# Patient Record
Sex: MALE | Race: BLACK OR AFRICAN AMERICAN | Employment: FULL TIME | URBAN - METROPOLITAN AREA
[De-identification: names, ages, dates, MRNs, and addresses within clinical notes are randomized per-mention and may not be internally consistent; named-entity substitution may affect disease eponyms.]

---

## 2017-03-07 ENCOUNTER — HOSPITAL ENCOUNTER (EMERGENCY)
Age: 47
Discharge: HOME | End: 2017-03-07
Payer: SELF-PAY

## 2017-03-07 ENCOUNTER — APPOINTMENT (OUTPATIENT)
Dept: CT IMAGING | Age: 47
End: 2017-03-07
Payer: SELF-PAY

## 2017-03-07 ENCOUNTER — APPOINTMENT (OUTPATIENT)
Dept: GENERAL RADIOLOGY | Age: 47
End: 2017-03-07
Payer: SELF-PAY

## 2017-03-07 VITALS
BODY MASS INDEX: 32.93 KG/M2 | DIASTOLIC BLOOD PRESSURE: 78 MMHG | HEIGHT: 70 IN | RESPIRATION RATE: 14 BRPM | TEMPERATURE: 98 F | WEIGHT: 230 LBS | OXYGEN SATURATION: 95 % | SYSTOLIC BLOOD PRESSURE: 174 MMHG | HEART RATE: 89 BPM

## 2017-03-07 DIAGNOSIS — R07.89 ATYPICAL CHEST PAIN: Primary | ICD-10-CM

## 2017-03-07 DIAGNOSIS — R51.9 NONINTRACTABLE HEADACHE, UNSPECIFIED CHRONICITY PATTERN, UNSPECIFIED HEADACHE TYPE: ICD-10-CM

## 2017-03-07 LAB
ALBUMIN SERPL BCP-MCNC: 3.7 G/DL (ref 3.5–5)
ALBUMIN/GLOB SERPL: 1.1 {RATIO} (ref 1.2–3.5)
ALP SERPL-CCNC: 118 U/L (ref 50–136)
ALT SERPL-CCNC: 51 U/L (ref 12–65)
ANION GAP BLD CALC-SCNC: 7 MMOL/L (ref 7–16)
AST SERPL W P-5'-P-CCNC: 39 U/L (ref 15–37)
ATRIAL RATE: 79 BPM
BASOPHILS # BLD AUTO: 0 K/UL (ref 0–0.2)
BASOPHILS # BLD: 0 % (ref 0–2)
BILIRUB SERPL-MCNC: 0.4 MG/DL (ref 0.2–1.1)
BUN SERPL-MCNC: 16 MG/DL (ref 6–23)
CALCIUM SERPL-MCNC: 9.3 MG/DL (ref 8.3–10.4)
CALCULATED P AXIS, ECG09: 49 DEGREES
CALCULATED R AXIS, ECG10: 69 DEGREES
CALCULATED T AXIS, ECG11: -74 DEGREES
CHLORIDE SERPL-SCNC: 109 MMOL/L (ref 98–107)
CO2 SERPL-SCNC: 27 MMOL/L (ref 21–32)
CREAT SERPL-MCNC: 1.08 MG/DL (ref 0.8–1.5)
D DIMER PPP FEU-MCNC: 0.86 UG/ML(FEU)
DIAGNOSIS, 93000: NORMAL
DIASTOLIC BP, ECG02: NORMAL MMHG
DIFFERENTIAL METHOD BLD: ABNORMAL
EOSINOPHIL # BLD: 0.3 K/UL (ref 0–0.8)
EOSINOPHIL NFR BLD: 5 % (ref 0.5–7.8)
ERYTHROCYTE [DISTWIDTH] IN BLOOD BY AUTOMATED COUNT: 14.1 % (ref 11.9–14.6)
GLOBULIN SER CALC-MCNC: 3.5 G/DL (ref 2.3–3.5)
GLUCOSE SERPL-MCNC: 98 MG/DL (ref 65–100)
HCT VFR BLD AUTO: 43.7 % (ref 41.1–50.3)
HGB BLD-MCNC: 15.3 G/DL (ref 13.6–17.2)
IMM GRANULOCYTES # BLD: 0 K/UL (ref 0–0.5)
IMM GRANULOCYTES NFR BLD AUTO: 0.4 % (ref 0–5)
LYMPHOCYTES # BLD AUTO: 26 % (ref 13–44)
LYMPHOCYTES # BLD: 1.2 K/UL (ref 0.5–4.6)
MAGNESIUM SERPL-MCNC: 2.3 MG/DL (ref 1.8–2.4)
MCH RBC QN AUTO: 26.1 PG (ref 26.1–32.9)
MCHC RBC AUTO-ENTMCNC: 35 G/DL (ref 31.4–35)
MCV RBC AUTO: 74.6 FL (ref 79.6–97.8)
MONOCYTES # BLD: 0.4 K/UL (ref 0.1–1.3)
MONOCYTES NFR BLD AUTO: 8 % (ref 4–12)
NEUTS SEG # BLD: 2.9 K/UL (ref 1.7–8.2)
NEUTS SEG NFR BLD AUTO: 61 % (ref 43–78)
P-R INTERVAL, ECG05: 168 MS
PLATELET # BLD AUTO: 147 K/UL (ref 150–450)
PMV BLD AUTO: ABNORMAL FL (ref 10.8–14.1)
POTASSIUM SERPL-SCNC: 3.7 MMOL/L (ref 3.5–5.1)
PROT SERPL-MCNC: 7.2 G/DL (ref 6.3–8.2)
Q-T INTERVAL, ECG07: 370 MS
QRS DURATION, ECG06: 80 MS
QTC CALCULATION (BEZET), ECG08: 424 MS
RBC # BLD AUTO: 5.86 M/UL (ref 4.23–5.67)
SODIUM SERPL-SCNC: 143 MMOL/L (ref 136–145)
SYSTOLIC BP, ECG01: NORMAL MMHG
TROPONIN I BLD-MCNC: 0 NG/ML (ref 0–0.08)
TSH SERPL DL<=0.005 MIU/L-ACNC: 1.85 UIU/ML (ref 0.36–3.74)
VENTRICULAR RATE, ECG03: 79 BPM
WBC # BLD AUTO: 4.8 K/UL (ref 4.3–11.1)

## 2017-03-07 PROCEDURE — 93005 ELECTROCARDIOGRAM TRACING: CPT

## 2017-03-07 PROCEDURE — 84443 ASSAY THYROID STIM HORMONE: CPT

## 2017-03-07 PROCEDURE — 74011636320 HC RX REV CODE- 636/320

## 2017-03-07 PROCEDURE — 84484 ASSAY OF TROPONIN QUANT: CPT

## 2017-03-07 PROCEDURE — 85379 FIBRIN DEGRADATION QUANT: CPT

## 2017-03-07 PROCEDURE — 74011000258 HC RX REV CODE- 258

## 2017-03-07 PROCEDURE — 83735 ASSAY OF MAGNESIUM: CPT

## 2017-03-07 PROCEDURE — 96375 TX/PRO/DX INJ NEW DRUG ADDON: CPT

## 2017-03-07 PROCEDURE — 71010 XR CHEST PORT: CPT

## 2017-03-07 PROCEDURE — 99285 EMERGENCY DEPT VISIT HI MDM: CPT

## 2017-03-07 PROCEDURE — 74011250637 HC RX REV CODE- 250/637

## 2017-03-07 PROCEDURE — 70450 CT HEAD/BRAIN W/O DYE: CPT

## 2017-03-07 PROCEDURE — 71260 CT THORAX DX C+: CPT

## 2017-03-07 PROCEDURE — 96374 THER/PROPH/DIAG INJ IV PUSH: CPT

## 2017-03-07 PROCEDURE — 96376 TX/PRO/DX INJ SAME DRUG ADON: CPT

## 2017-03-07 PROCEDURE — 85025 COMPLETE CBC W/AUTO DIFF WBC: CPT

## 2017-03-07 PROCEDURE — 74011250636 HC RX REV CODE- 250/636

## 2017-03-07 PROCEDURE — 80053 COMPREHEN METABOLIC PANEL: CPT

## 2017-03-07 RX ORDER — HYDRALAZINE HYDROCHLORIDE 20 MG/ML
20 INJECTION INTRAMUSCULAR; INTRAVENOUS
Status: COMPLETED | OUTPATIENT
Start: 2017-03-07 | End: 2017-03-07

## 2017-03-07 RX ORDER — GUAIFENESIN 100 MG/5ML
81 LIQUID (ML) ORAL DAILY
Qty: 30 TAB | Refills: 0 | Status: SHIPPED | OUTPATIENT
Start: 2017-03-07 | End: 2021-05-04 | Stop reason: CLARIF

## 2017-03-07 RX ORDER — MORPHINE SULFATE 4 MG/ML
4 INJECTION, SOLUTION INTRAMUSCULAR; INTRAVENOUS
Status: COMPLETED | OUTPATIENT
Start: 2017-03-07 | End: 2017-03-07

## 2017-03-07 RX ORDER — HYDROCODONE BITARTRATE AND ACETAMINOPHEN 7.5; 325 MG/1; MG/1
1 TABLET ORAL
Qty: 12 TAB | Refills: 0 | Status: SHIPPED | OUTPATIENT
Start: 2017-03-07 | End: 2021-05-04 | Stop reason: CLARIF

## 2017-03-07 RX ORDER — ONDANSETRON 2 MG/ML
4 INJECTION INTRAMUSCULAR; INTRAVENOUS
Status: COMPLETED | OUTPATIENT
Start: 2017-03-07 | End: 2017-03-07

## 2017-03-07 RX ORDER — GUAIFENESIN 100 MG/5ML
162 LIQUID (ML) ORAL
Status: COMPLETED | OUTPATIENT
Start: 2017-03-07 | End: 2017-03-07

## 2017-03-07 RX ORDER — LOSARTAN POTASSIUM 25 MG/1
25 TABLET ORAL DAILY
COMMUNITY
End: 2021-05-04 | Stop reason: CLARIF

## 2017-03-07 RX ORDER — AMLODIPINE BESYLATE 10 MG/1
10 TABLET ORAL DAILY
COMMUNITY
End: 2021-05-04 | Stop reason: CLARIF

## 2017-03-07 RX ORDER — HYDROCHLOROTHIAZIDE 25 MG/1
25 TABLET ORAL DAILY
COMMUNITY
End: 2021-05-04 | Stop reason: CLARIF

## 2017-03-07 RX ORDER — ONDANSETRON 2 MG/ML
4 INJECTION INTRAMUSCULAR; INTRAVENOUS
Status: DISCONTINUED | OUTPATIENT
Start: 2017-03-07 | End: 2017-03-07

## 2017-03-07 RX ORDER — CLONIDINE HYDROCHLORIDE 0.1 MG/1
0.2 TABLET ORAL
Status: COMPLETED | OUTPATIENT
Start: 2017-03-07 | End: 2017-03-07

## 2017-03-07 RX ORDER — SODIUM CHLORIDE 0.9 % (FLUSH) 0.9 %
10 SYRINGE (ML) INJECTION
Status: COMPLETED | OUTPATIENT
Start: 2017-03-07 | End: 2017-03-07

## 2017-03-07 RX ADMIN — IOPAMIDOL 100 ML: 755 INJECTION, SOLUTION INTRAVENOUS at 07:40

## 2017-03-07 RX ADMIN — MORPHINE SULFATE 4 MG: 4 INJECTION, SOLUTION INTRAMUSCULAR; INTRAVENOUS at 03:53

## 2017-03-07 RX ADMIN — HYDRALAZINE HYDROCHLORIDE 20 MG: 20 INJECTION INTRAMUSCULAR; INTRAVENOUS at 06:15

## 2017-03-07 RX ADMIN — ASPIRIN 162 MG: 81 TABLET, CHEWABLE ORAL at 03:52

## 2017-03-07 RX ADMIN — ASPIRIN 162 MG: 81 TABLET, CHEWABLE ORAL at 04:04

## 2017-03-07 RX ADMIN — SODIUM CHLORIDE 100 ML: 900 INJECTION, SOLUTION INTRAVENOUS at 07:39

## 2017-03-07 RX ADMIN — ONDANSETRON 4 MG: 2 INJECTION, SOLUTION INTRAMUSCULAR; INTRAVENOUS at 08:00

## 2017-03-07 RX ADMIN — HYDRALAZINE HYDROCHLORIDE 20 MG: 20 INJECTION INTRAMUSCULAR; INTRAVENOUS at 03:32

## 2017-03-07 RX ADMIN — CLONIDINE HYDROCHLORIDE 0.2 MG: 0.1 TABLET ORAL at 05:24

## 2017-03-07 RX ADMIN — Medication 10 ML: at 07:39

## 2017-03-07 RX ADMIN — NITROGLYCERIN 1 INCH: 20 OINTMENT TOPICAL at 03:52

## 2017-03-07 NOTE — LETTER
[address] [facility] - [place], [place] EMERGENCY DEPT One [address] [id number] [phone] Work/School Note Date: 3/7/2017 To Whom It May concern: 
 
Ifeoma Orozco was seen and treated today in the emergency room by the following provider(s): 
No providers found. Ifeoma Orozco may return to work on 3/8/16.  
 
Sincerely, 
 
 
 
 
Gregg Vargas RN

## 2017-03-07 NOTE — ED TRIAGE NOTES
Pt c/o intermittent chest pain, headache and dizziness since 11am. Pt denies any N/V/D. Pt denies any vision changes. Pt denies any shortness of breath. Pt hypertensive on arrival. States he does take BP meds and has not missed any doses. Pt alert and oriented x 4. Respirations are even and unlabored.  PT appears in no acute distress upon arrival.

## 2017-03-07 NOTE — ED NOTES
I have reviewed medications, follow up provider options, and discharge instructions with the patient. The patient verbalized understanding. Copy of discharge information given to patient upon discharge. Prescription(s) given to patient. Patient discharged in no distress. \

## 2017-03-07 NOTE — DISCHARGE INSTRUCTIONS
Chest Pain: Care Instructions  Your Care Instructions  There are many things that can cause chest pain. Some are not serious and will get better on their own in a few days. But some kinds of chest pain need more testing and treatment. Your doctor may have recommended a follow-up visit in the next 8 to 12 hours. If you are not getting better, you may need more tests or treatment. Even though your doctor has released you, you still need to watch for any problems. The doctor carefully checked you, but sometimes problems can develop later. If you have new symptoms or if your symptoms do not get better, get medical care right away. If you have worse or different chest pain or pressure that lasts more than 5 minutes or you passed out (lost consciousness), call 911 or seek other emergency help right away. A medical visit is only one step in your treatment. Even if you feel better, you still need to do what your doctor recommends, such as going to all suggested follow-up appointments and taking medicines exactly as directed. This will help you recover and help prevent future problems. How can you care for yourself at home? · Rest until you feel better. · Take your medicine exactly as prescribed. Call your doctor if you think you are having a problem with your medicine. · Do not drive after taking a prescription pain medicine. When should you call for help? Call 911 if:  · You passed out (lost consciousness). · You have severe difficulty breathing. · You have symptoms of a heart attack. These may include:  ¨ Chest pain or pressure, or a strange feeling in your chest.  ¨ Sweating. ¨ Shortness of breath. ¨ Nausea or vomiting. ¨ Pain, pressure, or a strange feeling in your back, neck, jaw, or upper belly or in one or both shoulders or arms. ¨ Lightheadedness or sudden weakness. ¨ A fast or irregular heartbeat.   After you call 911, the  may tell you to chew 1 adult-strength or 2 to 4 low-dose aspirin. Wait for an ambulance. Do not try to drive yourself. Call your doctor today if:  · You have any trouble breathing. · Your chest pain gets worse. · You are dizzy or lightheaded, or you feel like you may faint. · You are not getting better as expected. · You are having new or different chest pain. Where can you learn more? Go to http://yan-joellen.info/. Enter A120 in the search box to learn more about \"Chest Pain: Care Instructions. \"  Current as of: May 27, 2016  Content Version: 11.1  © 3220-0969 [employer]. Care instructions adapted under license by [employer] (which disclaims liability or warranty for this information). If you have questions about a medical condition or this instruction, always ask your healthcare professional. [address] any warranty or liability for your use of this information. Headache: Care Instructions  Your Care Instructions    Headaches have many possible causes. Most headaches aren't a sign of a more serious problem, and they will get better on their own. Home treatment may help you feel better faster. The doctor has checked you carefully, but problems can develop later. If you notice any problems or new symptoms, get medical treatment right away. Follow-up care is a key part of your treatment and safety. Be sure to make and go to all appointments, and call your doctor if you are having problems. It's also a good idea to know your test results and keep a list of the medicines you take. How can you care for yourself at home? · Do not drive if you have taken a prescription pain medicine. · Rest in a quiet, dark room until your headache is gone. Close your eyes and try to relax or go to sleep. Don't watch TV or read. · Put a cold, moist cloth or cold pack on the painful area for 10 to 20 minutes at a time. Put a thin cloth between the cold pack and your skin.   · Use a warm, moist towel or a heating pad set on low to relax tight shoulder and neck muscles. · Have someone gently massage your neck and shoulders. · Take pain medicines exactly as directed. ¨ If the doctor gave you a prescription medicine for pain, take it as prescribed. ¨ If you are not taking a prescription pain medicine, ask your doctor if you can take an over-the-counter medicine. · Be careful not to take pain medicine more often than the instructions allow, because you may get worse or more frequent headaches when the medicine wears off. · Do not ignore new symptoms that occur with a headache, such as a fever, weakness or numbness, vision changes, or confusion. These may be signs of a more serious problem. To prevent headaches  · Keep a headache diary so you can figure out what triggers your headaches. Avoiding triggers may help you prevent headaches. Record when each headache began, how long it lasted, and what the pain was like (throbbing, aching, stabbing, or dull). Write down any other symptoms you had with the headache, such as nausea, flashing lights or dark spots, or sensitivity to bright light or loud noise. Note if the headache occurred near your period. List anything that might have triggered the headache, such as certain foods (chocolate, cheese, wine) or odors, smoke, bright light, stress, or lack of sleep. · Find healthy ways to deal with stress. Headaches are most common during or right after stressful times. Take time to relax before and after you do something that has caused a headache in the past.  · Try to keep your muscles relaxed by keeping good posture. Check your jaw, face, neck, and shoulder muscles for tension, and try relaxing them. When sitting at a desk, change positions often, and stretch for 30 seconds each hour. · Get plenty of sleep and exercise. · Eat regularly and well. Long periods without food can trigger a headache. · Treat yourself to a massage.  Some people find that regular massages are very helpful in relieving tension. · Limit caffeine by not drinking too much coffee, tea, or soda. But don't quit caffeine suddenly, because that can also give you headaches. · Reduce eyestrain from computers by blinking frequently and looking away from the computer screen every so often. Make sure you have proper eyewear and that your monitor is set up properly, about an arm's length away. · Seek help if you have depression or anxiety. Your headaches may be linked to these conditions. Treatment can both prevent headaches and help with symptoms of anxiety or depression. When should you call for help? Call 911 anytime you think you may need emergency care. For example, call if:  · You have signs of a stroke. These may include:  ¨ Sudden numbness, paralysis, or weakness in your face, arm, or leg, especially on only one side of your body. ¨ Sudden vision changes. ¨ Sudden trouble speaking. ¨ Sudden confusion or trouble understanding simple statements. ¨ Sudden problems with walking or balance. ¨ A sudden, severe headache that is different from past headaches. Call your doctor now or seek immediate medical care if:  · You have a new or worse headache. · Your headache gets much worse. Where can you learn more? Go to http://yan-joellen.info/. Enter M271 in the search box to learn more about \"Headache: Care Instructions. \"  Current as of: February 19, 2016  Content Version: 11.1  © 8047-4232 [employer]. Care instructions adapted under license by [employer] (which disclaims liability or warranty for this information). If you have questions about a medical condition or this instruction, always ask your healthcare professional. [place] [address] any warranty or liability for your use of this information.

## 2017-03-07 NOTE — ED PROVIDER NOTES
HPI Comments: 68-year-old male complaining of headache dizziness chest pain. Dizziness started around 1 AM this morning. Followed by chest pain and headache. Patient has a history of hypertension on several medications for that and has not missed any doses. Patient's blood pressure is 197/110 when he arrives. Patient is a 51 y.o. male presenting with chest pain and dizziness. The history is provided by the patient. Chest Pain (Angina)    This is a new problem. The current episode started 3 to 5 hours ago. The problem has not changed since onset. Episode frequency: comes and goes lasting a brief period time 2-3 minutes. The pain is associated with normal activity. The pain is at a severity of 6/10. The quality of the pain is described as pressure-like. The pain does not radiate. Associated symptoms include dizziness. He has tried nothing for the symptoms. Risk factors include hypertension. Dizziness   Associated symptoms include chest pain. Past Medical History:   Diagnosis Date    Hypertension        History reviewed. No pertinent surgical history. History reviewed. No pertinent family history. Social History     Social History    Marital status:      Spouse name: N/A    Number of children: N/A    Years of education: N/A     Occupational History    Not on file. Social History Main Topics    Smoking status: Never Smoker    Smokeless tobacco: Not on file    Alcohol use Not on file    Drug use: Not on file    Sexual activity: Not on file     Other Topics Concern    Not on file     Social History Narrative    No narrative on file         ALLERGIES: Review of patient's allergies indicates no known allergies. Review of Systems   Constitutional: Negative. Negative for activity change. HENT: Negative. Eyes: Negative. Respiratory: Negative. Cardiovascular: Positive for chest pain. Gastrointestinal: Negative. Genitourinary: Negative.     Musculoskeletal: Negative. Skin: Negative. Neurological: Positive for dizziness. Psychiatric/Behavioral: Negative. All other systems reviewed and are negative. There were no vitals filed for this visit. Physical Exam   Constitutional: He is oriented to person, place, and time. He appears well-developed and well-nourished. No distress. HENT:   Head: Normocephalic and atraumatic. Right Ear: External ear normal.   Left Ear: External ear normal.   Nose: Nose normal.   Mouth/Throat: Oropharynx is clear and moist. No oropharyngeal exudate. Eyes: Conjunctivae and EOM are normal. Pupils are equal, round, and reactive to light. Right eye exhibits no discharge. Left eye exhibits no discharge. No scleral icterus. Neck: Normal range of motion. Neck supple. No JVD present. No tracheal deviation present. Cardiovascular: Normal rate, regular rhythm and intact distal pulses. Pulmonary/Chest: Effort normal and breath sounds normal. No stridor. No respiratory distress. He has no wheezes. He exhibits no tenderness. Abdominal: Soft. Bowel sounds are normal. He exhibits no distension and no mass. There is no tenderness. Musculoskeletal: Normal range of motion. He exhibits no edema or tenderness. Neurological: He is alert and oriented to person, place, and time. No cranial nerve deficit. Skin: Skin is warm and dry. No rash noted. He is not diaphoretic. No erythema. No pallor. Psychiatric: He has a normal mood and affect. His behavior is normal. Thought content normal.   Nursing note and vitals reviewed. MDM  Number of Diagnoses or Management Options  Diagnosis management comments: Patient's headache resolved chest pain resolved and then pain-free murmurs    2 sets of enzymes all were 0 chest x-ray did show some nonspecific ST T wave abnormalities but nothing old to compare to the patient is pain-free with normal enzymes at this time.   Discussed with cardiology they will follow up closely in the office.        Amount and/or Complexity of Data Reviewed  Clinical lab tests: reviewed and ordered  Tests in the radiology section of CPT®: reviewed and ordered  Tests in the medicine section of CPT®: ordered and reviewed  Independent visualization of images, tracings, or specimens: yes    Risk of Complications, Morbidity, and/or Mortality  Presenting problems: high  Diagnostic procedures: high  Management options: high      ED Course       Procedures

## 2020-06-08 ENCOUNTER — HOSPITAL ENCOUNTER (EMERGENCY)
Age: 50
Discharge: HOME | End: 2020-06-09
Attending: EMERGENCY MEDICINE
Payer: COMMERCIAL

## 2020-06-08 ENCOUNTER — APPOINTMENT (OUTPATIENT)
Dept: GENERAL RADIOLOGY | Age: 50
End: 2020-06-08
Attending: EMERGENCY MEDICINE
Payer: COMMERCIAL

## 2020-06-08 DIAGNOSIS — R07.89 CHEST WALL PAIN: Primary | ICD-10-CM

## 2020-06-08 LAB
ALBUMIN SERPL-MCNC: 3.3 G/DL (ref 3.5–5)
ALBUMIN/GLOB SERPL: 0.9 {RATIO} (ref 1.2–3.5)
ALP SERPL-CCNC: 85 U/L (ref 50–136)
ALT SERPL-CCNC: 60 U/L (ref 12–65)
ANION GAP SERPL CALC-SCNC: 6 MMOL/L (ref 7–16)
AST SERPL-CCNC: 39 U/L (ref 15–37)
BASOPHILS # BLD: 0.1 K/UL (ref 0–0.2)
BASOPHILS NFR BLD: 1 % (ref 0–2)
BILIRUB SERPL-MCNC: 0.5 MG/DL (ref 0.2–1.1)
BUN SERPL-MCNC: 15 MG/DL (ref 6–23)
CALCIUM SERPL-MCNC: 8.8 MG/DL (ref 8.3–10.4)
CHLORIDE SERPL-SCNC: 106 MMOL/L (ref 98–107)
CO2 SERPL-SCNC: 28 MMOL/L (ref 21–32)
CREAT SERPL-MCNC: 1.63 MG/DL (ref 0.8–1.5)
CRP SERPL-MCNC: 0.5 MG/DL (ref 0–0.9)
DIFFERENTIAL METHOD BLD: ABNORMAL
EOSINOPHIL # BLD: 0.3 K/UL (ref 0–0.8)
EOSINOPHIL NFR BLD: 5 % (ref 0.5–7.8)
ERYTHROCYTE [DISTWIDTH] IN BLOOD BY AUTOMATED COUNT: 14.5 % (ref 11.9–14.6)
GLOBULIN SER CALC-MCNC: 3.8 G/DL (ref 2.3–3.5)
GLUCOSE SERPL-MCNC: 260 MG/DL (ref 65–100)
HCT VFR BLD AUTO: 39.9 % (ref 41.1–50.3)
HGB BLD-MCNC: 13 G/DL (ref 13.6–17.2)
IMM GRANULOCYTES # BLD AUTO: 0 K/UL (ref 0–0.5)
IMM GRANULOCYTES NFR BLD AUTO: 1 % (ref 0–5)
LYMPHOCYTES # BLD: 1.2 K/UL (ref 0.5–4.6)
LYMPHOCYTES NFR BLD: 22 % (ref 13–44)
MCH RBC QN AUTO: 25 PG (ref 26.1–32.9)
MCHC RBC AUTO-ENTMCNC: 32.6 G/DL (ref 31.4–35)
MCV RBC AUTO: 76.9 FL (ref 79.6–97.8)
MONOCYTES # BLD: 0.3 K/UL (ref 0.1–1.3)
MONOCYTES NFR BLD: 6 % (ref 4–12)
NEUTS SEG # BLD: 3.6 K/UL (ref 1.7–8.2)
NEUTS SEG NFR BLD: 66 % (ref 43–78)
NRBC # BLD: 0 K/UL (ref 0–0.2)
PLATELET # BLD AUTO: 169 K/UL (ref 150–450)
PMV BLD AUTO: 11.6 FL (ref 9.4–12.3)
POTASSIUM SERPL-SCNC: 3.8 MMOL/L (ref 3.5–5.1)
PROT SERPL-MCNC: 7.1 G/DL (ref 6.3–8.2)
RBC # BLD AUTO: 5.19 M/UL (ref 4.23–5.6)
SODIUM SERPL-SCNC: 140 MMOL/L (ref 136–145)
TROPONIN-HIGH SENSITIVITY: 6.9 PG/ML (ref 0–14)
WBC # BLD AUTO: 5.5 K/UL (ref 4.3–11.1)

## 2020-06-08 PROCEDURE — 86140 C-REACTIVE PROTEIN: CPT

## 2020-06-08 PROCEDURE — 85379 FIBRIN DEGRADATION QUANT: CPT

## 2020-06-08 PROCEDURE — 85025 COMPLETE CBC W/AUTO DIFF WBC: CPT

## 2020-06-08 PROCEDURE — 96374 THER/PROPH/DIAG INJ IV PUSH: CPT

## 2020-06-08 PROCEDURE — 80053 COMPREHEN METABOLIC PANEL: CPT

## 2020-06-08 PROCEDURE — 99285 EMERGENCY DEPT VISIT HI MDM: CPT

## 2020-06-08 PROCEDURE — 71046 X-RAY EXAM CHEST 2 VIEWS: CPT

## 2020-06-08 PROCEDURE — 93005 ELECTROCARDIOGRAM TRACING: CPT | Performed by: EMERGENCY MEDICINE

## 2020-06-08 PROCEDURE — 74011250636 HC RX REV CODE- 250/636: Performed by: EMERGENCY MEDICINE

## 2020-06-08 PROCEDURE — 84484 ASSAY OF TROPONIN QUANT: CPT

## 2020-06-08 RX ORDER — KETOROLAC TROMETHAMINE 30 MG/ML
30 INJECTION, SOLUTION INTRAMUSCULAR; INTRAVENOUS
Status: COMPLETED | OUTPATIENT
Start: 2020-06-08 | End: 2020-06-08

## 2020-06-08 RX ADMIN — KETOROLAC TROMETHAMINE 30 MG: 30 INJECTION, SOLUTION INTRAMUSCULAR; INTRAVENOUS at 23:12

## 2020-06-08 NOTE — ED TRIAGE NOTES
Patient ambulatory to triage without difficulty with mask in place. Patient reports chest pain all across chest and high blood pressure for 2 days. States he is on losartan, hctz, and carvedilol for this blood pressure and has been compliant with his medications. Reports nausea and some SOB. Denies vomiting or diarrhea.

## 2020-06-09 VITALS
SYSTOLIC BLOOD PRESSURE: 171 MMHG | HEIGHT: 68 IN | OXYGEN SATURATION: 94 % | TEMPERATURE: 98.2 F | HEART RATE: 88 BPM | DIASTOLIC BLOOD PRESSURE: 103 MMHG | RESPIRATION RATE: 18 BRPM | WEIGHT: 290 LBS | BODY MASS INDEX: 43.95 KG/M2

## 2020-06-09 LAB
ATRIAL RATE: 91 BPM
CALCULATED P AXIS, ECG09: 55 DEGREES
CALCULATED R AXIS, ECG10: 92 DEGREES
CALCULATED T AXIS, ECG11: -43 DEGREES
D DIMER PPP FEU-MCNC: 0.69 UG/ML(FEU)
DIAGNOSIS, 93000: NORMAL
P-R INTERVAL, ECG05: 164 MS
Q-T INTERVAL, ECG07: 360 MS
QRS DURATION, ECG06: 80 MS
QTC CALCULATION (BEZET), ECG08: 442 MS
VENTRICULAR RATE, ECG03: 91 BPM

## 2020-06-09 RX ORDER — DICLOFENAC SODIUM 75 MG/1
75 TABLET, DELAYED RELEASE ORAL 2 TIMES DAILY
Qty: 20 TAB | Refills: 0 | Status: SHIPPED | OUTPATIENT
Start: 2020-06-09 | End: 2021-05-04 | Stop reason: CLARIF

## 2020-06-09 NOTE — ED PROVIDER NOTES
Patient presents emerge department with complaints of chest pain and shortness of breath. He states the symptoms began yesterday and have been present constantly ever since onset. He describes a pressure-like sensation across the anterior chest without radiation. It is constantly dull and intermittently sharp. No increasing or decreasing factors are noted. He denies any fever or chills or cough. The history is provided by the patient. Chest Pain    This is a new problem. The current episode started yesterday. The problem has not changed since onset. The problem occurs constantly. Associated with: Unknown. Pain location: Anterior chest. The pain is at a severity of 7/10. The pain is moderate. The quality of the pain is described as pressure-like and sharp. The pain does not radiate. Exacerbated by: Nothing. Associated symptoms include shortness of breath. Pertinent negatives include no abdominal pain, no back pain, no claudication, no cough, no diaphoresis, no dizziness, no exertional chest pressure, no fever, no headaches, no hemoptysis, no irregular heartbeat, no leg pain, no lower extremity edema, no malaise/fatigue, no nausea, no near-syncope, no numbness, no orthopnea, no palpitations, no PND, no sputum production, no vomiting and no weakness. He has tried nothing for the symptoms. The treatment provided no relief. Risk factors include male gender, hypertension and obesity. Past Medical History:   Diagnosis Date    Hypertension        No past surgical history on file. No family history on file.     Social History     Socioeconomic History    Marital status:      Spouse name: Not on file    Number of children: Not on file    Years of education: Not on file    Highest education level: Not on file   Occupational History    Not on file   Social Needs    Financial resource strain: Not on file    Food insecurity     Worry: Not on file     Inability: Not on file   [employer] needs     Medical: Not on file     Non-medical: Not on file   Tobacco Use    Smoking status: Never Smoker   Substance and Sexual Activity    Alcohol use: Not on file    Drug use: Not on file    Sexual activity: Not on file   Lifestyle    Physical activity     Days per week: Not on file     Minutes per session: Not on file    Stress: Not on file   Relationships    Social connections     Talks on phone: Not on file     Gets together: Not on file     Attends Congregation service: Not on file     Active member of club or organization: Not on file     Attends meetings of clubs or organizations: Not on file     Relationship status: Not on file    Intimate partner violence     Fear of current or ex partner: Not on file     Emotionally abused: Not on file     Physically abused: Not on file     Forced sexual activity: Not on file   Other Topics Concern    Not on file   Social History Narrative    Not on file         ALLERGIES: Patient has no known allergies. Review of Systems   Constitutional: Negative for diaphoresis, fever and malaise/fatigue. Respiratory: Positive for shortness of breath. Negative for cough, hemoptysis and sputum production. Cardiovascular: Positive for chest pain. Negative for palpitations, orthopnea, claudication, PND and near-syncope. Gastrointestinal: Negative for abdominal pain, nausea and vomiting. Musculoskeletal: Negative for back pain. Neurological: Negative for dizziness, weakness, numbness and headaches. All other systems reviewed and are negative. Vitals:    06/08/20 2113 06/08/20 2144 06/08/20 2213 06/08/20 2244   BP: 153/88 (!) 164/97 190/88 (!) 166/97   Pulse: 83 81 79 79   Resp:       Temp:       SpO2:       Weight:       Height:                Physical Exam  Vitals signs and nursing note reviewed. Constitutional:       General: He is not in acute distress. Appearance: Normal appearance. He is normal weight.  He is not ill-appearing, toxic-appearing or diaphoretic. HENT:      Head: Normocephalic and atraumatic. Nose: Nose normal.      Mouth/Throat:      Mouth: Mucous membranes are moist.      Pharynx: Oropharynx is clear. Eyes:      Extraocular Movements: Extraocular movements intact. Conjunctiva/sclera: Conjunctivae normal.      Pupils: Pupils are equal, round, and reactive to light. Neck:      Musculoskeletal: Normal range of motion and neck supple. Cardiovascular:      Rate and Rhythm: Normal rate and regular rhythm. Pulses: Normal pulses. Heart sounds: Normal heart sounds. Pulmonary:      Effort: Pulmonary effort is normal.      Breath sounds: Normal breath sounds. Chest:      Chest wall: No tenderness. Musculoskeletal: Normal range of motion. Skin:     General: Skin is warm and dry. Capillary Refill: Capillary refill takes less than 2 seconds. Neurological:      General: No focal deficit present. Mental Status: He is alert and oriented to person, place, and time. Mental status is at baseline. Psychiatric:         Mood and Affect: Mood normal.         Behavior: Behavior normal.         Thought Content: Thought content normal.          MDM  Number of Diagnoses or Management Options  Diagnosis management comments: Patient symptoms have been present constantly since yesterday and EKG shows nothing acute and troponin is negative cardiac etiology is ruled out. Will assess for alternative diagnosis such as pulmonary embolism, although patient had venous ultrasound of the extremities in March which was negative for DVT. Musculoskeletal etiology is most likely. Amount and/or Complexity of Data Reviewed  Clinical lab tests: ordered and reviewed  Tests in the radiology section of CPT®: ordered and reviewed  Review and summarize past medical records: yes  Discuss the patient with other providers: yes  Independent visualization of images, tracings, or specimens: yes (EKG at 1818:  Is a normal, right axis deviation is noted no acute ischemic changes.)    Risk of Complications, Morbidity, and/or Mortality  Presenting problems: moderate  Diagnostic procedures: moderate  Management options: moderate    Patient Progress  Patient progress: stable         Procedures

## 2020-06-09 NOTE — ED NOTES
I have reviewed discharge instructions with the patient. The patient verbalized understanding. Patient left ED via Discharge Method: ambulatory to Home with family. Opportunity for questions and clarification provided. Patient given 1 scripts. To continue your aftercare when you leave the hospital, you may receive an automated call from our care team to check in on how you are doing. This is a free service and part of our promise to provide the best care and service to meet your aftercare needs.  If you have questions, or wish to unsubscribe from this service please call [phone]. Thank you for Choosing our [facility].

## 2021-03-24 ENCOUNTER — HOSPITAL ENCOUNTER (OUTPATIENT)
Dept: LAB | Age: 51
Discharge: HOME | End: 2021-03-24

## 2021-03-24 PROCEDURE — 89060 EXAM SYNOVIAL FLUID CRYSTALS: CPT

## 2021-03-24 PROCEDURE — 89050 BODY FLUID CELL COUNT: CPT

## 2021-03-24 PROCEDURE — 87070 CULTURE OTHR SPECIMN AEROBIC: CPT

## 2021-03-25 LAB
APPEARANCE FLD: CLEAR
BODY FLD TYPE: NORMAL
COLOR FLD: YELLOW
CRYSTALS FLD MICRO: NORMAL
LYMPHOCYTES NFR BRONCH MANUAL: 94 %
MACROPHAGES NFR BRONCH MANUAL: 2 %
NEUTROPHILS NFR BRONCH MANUAL: 4 %
NUC CELL # FLD: 383 /CU MM
RBC # FLD: <1000 /CU MM
SPECIMEN SOURCE FLD: NORMAL

## 2021-03-27 LAB
BACTERIA SPEC CULT: NORMAL
GRAM STN SPEC: NORMAL
GRAM STN SPEC: NORMAL
SERVICE CMNT-IMP: NORMAL

## 2021-05-04 ENCOUNTER — HOSPITAL ENCOUNTER (OUTPATIENT)
Dept: SURGERY | Age: 51
Discharge: HOME | End: 2021-05-04

## 2021-05-04 VITALS
OXYGEN SATURATION: 98 % | HEIGHT: 69 IN | DIASTOLIC BLOOD PRESSURE: 101 MMHG | TEMPERATURE: 98.2 F | RESPIRATION RATE: 18 BRPM | HEART RATE: 77 BPM | WEIGHT: 285 LBS | SYSTOLIC BLOOD PRESSURE: 143 MMHG | BODY MASS INDEX: 42.21 KG/M2

## 2021-05-04 LAB
ANION GAP SERPL CALC-SCNC: 4 MMOL/L (ref 7–16)
BACTERIA SPEC CULT: NORMAL
BASOPHILS # BLD: 0.1 K/UL (ref 0–0.2)
BASOPHILS NFR BLD: 1 % (ref 0–2)
BUN SERPL-MCNC: 14 MG/DL (ref 6–23)
CALCIUM SERPL-MCNC: 9.8 MG/DL (ref 8.3–10.4)
CHLORIDE SERPL-SCNC: 106 MMOL/L (ref 98–107)
CO2 SERPL-SCNC: 30 MMOL/L (ref 21–32)
CREAT SERPL-MCNC: 1.18 MG/DL (ref 0.8–1.5)
DIFFERENTIAL METHOD BLD: ABNORMAL
EOSINOPHIL # BLD: 0.2 K/UL (ref 0–0.8)
EOSINOPHIL NFR BLD: 3 % (ref 0.5–7.8)
ERYTHROCYTE [DISTWIDTH] IN BLOOD BY AUTOMATED COUNT: 15 % (ref 11.9–14.6)
GLUCOSE BLD STRIP.AUTO-MCNC: 77 MG/DL (ref 65–100)
GLUCOSE SERPL-MCNC: 75 MG/DL (ref 65–100)
HCT VFR BLD AUTO: 44.1 % (ref 41.1–50.3)
HGB BLD-MCNC: 14.4 G/DL (ref 13.6–17.2)
IMM GRANULOCYTES # BLD AUTO: 0 K/UL (ref 0–0.5)
IMM GRANULOCYTES NFR BLD AUTO: 1 % (ref 0–5)
LYMPHOCYTES # BLD: 1.7 K/UL (ref 0.5–4.6)
LYMPHOCYTES NFR BLD: 24 % (ref 13–44)
MCH RBC QN AUTO: 25 PG (ref 26.1–32.9)
MCHC RBC AUTO-ENTMCNC: 32.7 G/DL (ref 31.4–35)
MCV RBC AUTO: 76.6 FL (ref 79.6–97.8)
MONOCYTES # BLD: 0.7 K/UL (ref 0.1–1.3)
MONOCYTES NFR BLD: 11 % (ref 4–12)
NEUTS SEG # BLD: 4.1 K/UL (ref 1.7–8.2)
NEUTS SEG NFR BLD: 60 % (ref 43–78)
NRBC # BLD: 0 K/UL (ref 0–0.2)
PLATELET # BLD AUTO: 206 K/UL (ref 150–450)
PMV BLD AUTO: 11.1 FL (ref 9.4–12.3)
POTASSIUM SERPL-SCNC: 3.8 MMOL/L (ref 3.5–5.1)
RBC # BLD AUTO: 5.76 M/UL (ref 4.23–5.6)
SERVICE CMNT-IMP: NORMAL
SERVICE CMNT-IMP: NORMAL
SODIUM SERPL-SCNC: 140 MMOL/L (ref 136–145)
WBC # BLD AUTO: 6.9 K/UL (ref 4.3–11.1)

## 2021-05-04 PROCEDURE — 85025 COMPLETE CBC W/AUTO DIFF WBC: CPT

## 2021-05-04 PROCEDURE — 82962 GLUCOSE BLOOD TEST: CPT

## 2021-05-04 PROCEDURE — 87641 MR-STAPH DNA AMP PROBE: CPT

## 2021-05-04 PROCEDURE — 36415 COLL VENOUS BLD VENIPUNCTURE: CPT

## 2021-05-04 PROCEDURE — 80048 BASIC METABOLIC PNL TOTAL CA: CPT

## 2021-05-04 RX ORDER — SEMAGLUTIDE 1.34 MG/ML
1 INJECTION, SOLUTION SUBCUTANEOUS
COMMUNITY

## 2021-05-04 RX ORDER — INSULIN GLARGINE 100 [IU]/ML
15 INJECTION, SOLUTION SUBCUTANEOUS
COMMUNITY
End: 2022-05-02

## 2021-05-04 RX ORDER — CARVEDILOL 12.5 MG/1
12.5 TABLET ORAL 2 TIMES DAILY WITH MEALS
COMMUNITY

## 2021-05-04 NOTE — PERIOP NOTES
PLEASE CONTINUE TAKING ALL PRESCRIPTION MEDICATIONS UP TO THE DAY OF SURGERY UNLESS OTHERWISE DIRECTED BELOW. DISCONTINUE all vitamins and supplements 7 days prior to surgery. DISCONTINUE Non-Steriodal Anti-Inflammatory (NSAIDS) such as Advil and Aleve 5 days prior to surgery. Home Medications to take  the day of surgery    Carvedilol           Home Medications   to Hold   No Farxiga morning of surgery,    Lantus 12 units night before surgery     Comments    Covid test 5/4/21 @ 2 2 [facility],[address] [facility], 0 [facility] of 1 visitor per patient discussed. Please do not bring home medications with you on the day of surgery unless otherwise directed by your nurse. If you are instructed to bring home medications, please give them to your nurse as they will be administered by the nursing staff. If you have any questions, please call [facility] [phone] or [facility] [phone]. A copy of this note was provided to the patient for reference.

## 2021-05-04 NOTE — PERIOP NOTES
Patient verified name and     Order for consent  found in EHR and matches case posting; patient verified. Type 3 surgery, walk in assessment complete. Labs per surgeon: none;   Labs per anesthesia protocol: cbc,bmp,MRSA, SQBS =77; results pending  [id number]     Patient COVID test date 21; Patient did  show for the appointment. The testing center is located at the [address], [place]. If appointment is needed patient provided telephone number of [phone]. Hospital approved surgical skin cleanser and instructions given per hospital policy. Patient provided with and instructed on educational handouts including Guide to Surgery, Pain Management, Hand Hygiene, Blood Transfusion Education, and [facility] Brochure. Patient answered medical/surgical history questions at their best of ability. All prior to admission medications documented in [facility]. Original medication prescription bottle not visualized during patient appointment. Patient instructed to hold all vitamins 7 days prior to surgery and NSAIDS 5 days prior to surgery, patient verbalized understanding. Patient teach back successful and patient demonstrates knowledge of instructions.

## 2021-05-05 NOTE — PROGRESS NOTES
MRSA/SA nasal swab results reveals both are negative. BMP results noted and are within anesthesia protocols.

## 2021-05-10 ENCOUNTER — ANESTHESIA EVENT (OUTPATIENT)
Dept: SURGERY | Age: 51
End: 2021-05-10

## 2021-05-11 ENCOUNTER — HOSPITAL ENCOUNTER (OUTPATIENT)
Age: 51
Setting detail: OBSERVATION
Discharge: HOME HEALTH | End: 2021-05-12
Attending: ORTHOPAEDIC SURGERY | Admitting: ORTHOPAEDIC SURGERY
Payer: COMMERCIAL

## 2021-05-11 ENCOUNTER — ANESTHESIA (OUTPATIENT)
Dept: SURGERY | Age: 51
End: 2021-05-11

## 2021-05-11 DIAGNOSIS — L76.82 PAIN AT SURGICAL INCISION: Primary | ICD-10-CM

## 2021-05-11 PROBLEM — M17.11 ARTHRITIS OF RIGHT KNEE: Status: ACTIVE | Noted: 2021-05-11

## 2021-05-11 LAB
ABO + RH BLD: NORMAL
ANION GAP SERPL CALC-SCNC: 3 MMOL/L (ref 7–16)
APPEARANCE UR: CLEAR
APTT PPP: 28.7 SEC (ref 24.1–35.1)
ATRIAL RATE: 80 BPM
BACTERIA SPEC CULT: NORMAL
BACTERIA URNS QL MICRO: 0 /HPF
BASOPHILS # BLD: 0 K/UL (ref 0–0.2)
BASOPHILS NFR BLD: 0 % (ref 0–2)
BILIRUB UR QL: NEGATIVE
BLOOD GROUP ANTIBODIES SERPL: NORMAL
BUN SERPL-MCNC: 18 MG/DL (ref 6–23)
CALCIUM SERPL-MCNC: 8.7 MG/DL (ref 8.3–10.4)
CALCULATED P AXIS, ECG09: 34 DEGREES
CALCULATED R AXIS, ECG10: 16 DEGREES
CALCULATED T AXIS, ECG11: 65 DEGREES
CASTS URNS QL MICRO: 0 /LPF
CHLORIDE SERPL-SCNC: 110 MMOL/L (ref 98–107)
CO2 SERPL-SCNC: 30 MMOL/L (ref 21–32)
COLOR UR: YELLOW
CREAT SERPL-MCNC: 1.46 MG/DL (ref 0.8–1.5)
DIAGNOSIS, 93000: NORMAL
DIFFERENTIAL METHOD BLD: ABNORMAL
EOSINOPHIL # BLD: 0.1 K/UL (ref 0–0.8)
EOSINOPHIL NFR BLD: 2 % (ref 0.5–7.8)
EPI CELLS #/AREA URNS HPF: 0 /HPF
ERYTHROCYTE [DISTWIDTH] IN BLOOD BY AUTOMATED COUNT: 14.6 % (ref 11.9–14.6)
EST. AVERAGE GLUCOSE BLD GHB EST-MCNC: 126 MG/DL
GLUCOSE BLD STRIP.AUTO-MCNC: 112 MG/DL (ref 65–100)
GLUCOSE BLD STRIP.AUTO-MCNC: 117 MG/DL (ref 65–100)
GLUCOSE BLD STRIP.AUTO-MCNC: 135 MG/DL (ref 65–100)
GLUCOSE SERPL-MCNC: 156 MG/DL (ref 65–100)
GLUCOSE UR STRIP.AUTO-MCNC: NEGATIVE MG/DL
HBA1C MFR BLD: 6 % (ref 4.2–6.3)
HCT VFR BLD AUTO: 43.8 % (ref 41.1–50.3)
HGB BLD-MCNC: 13 G/DL (ref 13.6–17.2)
HGB BLD-MCNC: 13.6 G/DL (ref 13.6–17.2)
HGB UR QL STRIP: NEGATIVE
IMM GRANULOCYTES # BLD AUTO: 0.1 K/UL (ref 0–0.5)
IMM GRANULOCYTES NFR BLD AUTO: 1 % (ref 0–5)
INR PPP: 1
KETONES UR QL STRIP.AUTO: NEGATIVE MG/DL
LEUKOCYTE ESTERASE UR QL STRIP.AUTO: NEGATIVE
LYMPHOCYTES # BLD: 1 K/UL (ref 0.5–4.6)
LYMPHOCYTES NFR BLD: 14 % (ref 13–44)
MCH RBC QN AUTO: 24.6 PG (ref 26.1–32.9)
MCHC RBC AUTO-ENTMCNC: 31.1 G/DL (ref 31.4–35)
MCV RBC AUTO: 79.2 FL (ref 79.6–97.8)
MONOCYTES # BLD: 0.3 K/UL (ref 0.1–1.3)
MONOCYTES NFR BLD: 4 % (ref 4–12)
NEUTS SEG # BLD: 5.8 K/UL (ref 1.7–8.2)
NEUTS SEG NFR BLD: 80 % (ref 43–78)
NITRITE UR QL STRIP.AUTO: NEGATIVE
NRBC # BLD: 0 K/UL (ref 0–0.2)
P-R INTERVAL, ECG05: 166 MS
PH UR STRIP: 5.5 [PH] (ref 5–9)
PLATELET # BLD AUTO: 171 K/UL (ref 150–450)
PMV BLD AUTO: 11 FL (ref 9.4–12.3)
POTASSIUM SERPL-SCNC: 3.8 MMOL/L (ref 3.5–5.1)
PROT UR STRIP-MCNC: ABNORMAL MG/DL
PROTHROMBIN TIME: 13.6 SEC (ref 12.5–14.7)
Q-T INTERVAL, ECG07: 380 MS
QRS DURATION, ECG06: 78 MS
QTC CALCULATION (BEZET), ECG08: 438 MS
RBC # BLD AUTO: 5.53 M/UL (ref 4.23–5.6)
RBC #/AREA URNS HPF: 0 /HPF
SERVICE CMNT-IMP: ABNORMAL
SERVICE CMNT-IMP: NORMAL
SODIUM SERPL-SCNC: 143 MMOL/L (ref 136–145)
SP GR UR REFRACTOMETRY: 1.02 (ref 1–1.02)
SPECIMEN EXP DATE BLD: NORMAL
UROBILINOGEN UR QL STRIP.AUTO: 0.2 EU/DL (ref 0.2–1)
VENTRICULAR RATE, ECG03: 80 BPM
WBC # BLD AUTO: 7.3 K/UL (ref 4.3–11.1)
WBC URNS QL MICRO: ABNORMAL /HPF

## 2021-05-11 PROCEDURE — 77030006790 HC BLD SAW OSC ZIMM -B: Performed by: ORTHOPAEDIC SURGERY

## 2021-05-11 PROCEDURE — 74011250636 HC RX REV CODE- 250/636: Performed by: ANESTHESIOLOGY

## 2021-05-11 PROCEDURE — 74011000250 HC RX REV CODE- 250: Performed by: ORTHOPAEDIC SURGERY

## 2021-05-11 PROCEDURE — 2709999900 HC NON-CHARGEABLE SUPPLY

## 2021-05-11 PROCEDURE — 74011250636 HC RX REV CODE- 250/636: Performed by: ORTHOPAEDIC SURGERY

## 2021-05-11 PROCEDURE — 74011000250 HC RX REV CODE- 250: Performed by: ANESTHESIOLOGY

## 2021-05-11 PROCEDURE — 76010000171 HC OR TIME 2 TO 2.5 HR INTENSV-TIER 1: Performed by: ORTHOPAEDIC SURGERY

## 2021-05-11 PROCEDURE — 27447 TOTAL KNEE ARTHROPLASTY: CPT | Performed by: ORTHOPAEDIC SURGERY

## 2021-05-11 PROCEDURE — 74011636637 HC RX REV CODE- 636/637: Performed by: ORTHOPAEDIC SURGERY

## 2021-05-11 PROCEDURE — 77030002933 HC SUT MCRYL J&J -A: Performed by: ORTHOPAEDIC SURGERY

## 2021-05-11 PROCEDURE — 76060000035 HC ANESTHESIA 2 TO 2.5 HR: Performed by: ORTHOPAEDIC SURGERY

## 2021-05-11 PROCEDURE — 87641 MR-STAPH DNA AMP PROBE: CPT

## 2021-05-11 PROCEDURE — 77030020274 HC MISC IMPL ORTHOPEDIC: Performed by: ORTHOPAEDIC SURGERY

## 2021-05-11 PROCEDURE — 76210000063 HC OR PH I REC FIRST 0.5 HR: Performed by: ORTHOPAEDIC SURGERY

## 2021-05-11 PROCEDURE — 77030003665 HC NDL SPN BBMI -A: Performed by: ANESTHESIOLOGY

## 2021-05-11 PROCEDURE — 85610 PROTHROMBIN TIME: CPT

## 2021-05-11 PROCEDURE — 86900 BLOOD TYPING SEROLOGIC ABO: CPT

## 2021-05-11 PROCEDURE — 99218 HC RM OBSERVATION: CPT

## 2021-05-11 PROCEDURE — 74011250637 HC RX REV CODE- 250/637: Performed by: ORTHOPAEDIC SURGERY

## 2021-05-11 PROCEDURE — 93005 ELECTROCARDIOGRAM TRACING: CPT | Performed by: ORTHOPAEDIC SURGERY

## 2021-05-11 PROCEDURE — 85730 THROMBOPLASTIN TIME PARTIAL: CPT

## 2021-05-11 PROCEDURE — 85018 HEMOGLOBIN: CPT

## 2021-05-11 PROCEDURE — 77030008462 HC STPLR SKN PROX J&J -A: Performed by: ORTHOPAEDIC SURGERY

## 2021-05-11 PROCEDURE — 77030011208: Performed by: ORTHOPAEDIC SURGERY

## 2021-05-11 PROCEDURE — 74011000250 HC RX REV CODE- 250: Performed by: NURSE ANESTHETIST, CERTIFIED REGISTERED

## 2021-05-11 PROCEDURE — 77030019557 HC ELECTRD VES SEAL MEDT -F: Performed by: ORTHOPAEDIC SURGERY

## 2021-05-11 PROCEDURE — 77030007880 HC KT SPN EPDRL BBMI -B: Performed by: ANESTHESIOLOGY

## 2021-05-11 PROCEDURE — 81003 URINALYSIS AUTO W/O SCOPE: CPT

## 2021-05-11 PROCEDURE — 77030018547 HC SUT ETHBND1 J&J -B: Performed by: ORTHOPAEDIC SURGERY

## 2021-05-11 PROCEDURE — 74011250636 HC RX REV CODE- 250/636: Performed by: NURSE ANESTHETIST, CERTIFIED REGISTERED

## 2021-05-11 PROCEDURE — 85025 COMPLETE CBC W/AUTO DIFF WBC: CPT

## 2021-05-11 PROCEDURE — 76010010054 HC POST OP PAIN BLOCK: Performed by: ORTHOPAEDIC SURGERY

## 2021-05-11 PROCEDURE — 82962 GLUCOSE BLOOD TEST: CPT

## 2021-05-11 PROCEDURE — 83036 HEMOGLOBIN GLYCOSYLATED A1C: CPT

## 2021-05-11 PROCEDURE — 76942 ECHO GUIDE FOR BIOPSY: CPT | Performed by: ORTHOPAEDIC SURGERY

## 2021-05-11 PROCEDURE — 77030012935 HC DRSG AQUACEL BMS -B: Performed by: ORTHOPAEDIC SURGERY

## 2021-05-11 PROCEDURE — 77030003602 HC NDL NRV BLK BBMI -B: Performed by: ANESTHESIOLOGY

## 2021-05-11 PROCEDURE — 80048 BASIC METABOLIC PNL TOTAL CA: CPT

## 2021-05-11 PROCEDURE — C1776 JOINT DEVICE (IMPLANTABLE): HCPCS | Performed by: ORTHOPAEDIC SURGERY

## 2021-05-11 PROCEDURE — 36415 COLL VENOUS BLD VENIPUNCTURE: CPT

## 2021-05-11 PROCEDURE — 2709999900 HC NON-CHARGEABLE SUPPLY: Performed by: ORTHOPAEDIC SURGERY

## 2021-05-11 PROCEDURE — 74011250637 HC RX REV CODE- 250/637: Performed by: ANESTHESIOLOGY

## 2021-05-11 DEVICE — JOURNEY II BCS XLPE ARTICULAR                                    INSERT SIZE 5-6 RIGHT 10MM
Type: IMPLANTABLE DEVICE | Site: KNEE | Status: FUNCTIONAL
Brand: JOURNEY

## 2021-05-11 DEVICE — JOURNEY BCS PATELLA  RESURFACING                                    ROUND 35 MM  STD
Type: IMPLANTABLE DEVICE | Site: KNEE | Status: FUNCTIONAL
Brand: JOURNEY

## 2021-05-11 DEVICE — KNEE K1 TOT HEMI STD CEM IMPL CAPPED K1 SN: Type: IMPLANTABLE DEVICE | Status: FUNCTIONAL

## 2021-05-11 DEVICE — JOURNEY II BCS FEMORAL OXINIUM                                    RIGHT SIZE 6
Type: IMPLANTABLE DEVICE | Site: KNEE | Status: FUNCTIONAL
Brand: JOURNEY

## 2021-05-11 DEVICE — RALLY MV AB BONE CEMENT 40 GRAMS
Type: IMPLANTABLE DEVICE | Site: KNEE | Status: FUNCTIONAL
Brand: RALLY

## 2021-05-11 DEVICE — JOURNEY TIBIAL BASEPLATE NONPOROUS                                    RIGHT SIZE 5
Type: IMPLANTABLE DEVICE | Site: KNEE | Status: FUNCTIONAL
Brand: JOURNEY

## 2021-05-11 RX ORDER — CELECOXIB 200 MG/1
200 CAPSULE ORAL ONCE
Status: COMPLETED | OUTPATIENT
Start: 2021-05-11 | End: 2021-05-11

## 2021-05-11 RX ORDER — KETOROLAC TROMETHAMINE 30 MG/ML
INJECTION, SOLUTION INTRAMUSCULAR; INTRAVENOUS AS NEEDED
Status: DISCONTINUED | OUTPATIENT
Start: 2021-05-11 | End: 2021-05-11 | Stop reason: HOSPADM

## 2021-05-11 RX ORDER — ACETAMINOPHEN 500 MG
1000 TABLET ORAL EVERY 6 HOURS
Status: DISCONTINUED | OUTPATIENT
Start: 2021-05-12 | End: 2021-05-12 | Stop reason: HOSPADM

## 2021-05-11 RX ORDER — MIDAZOLAM HYDROCHLORIDE 1 MG/ML
2 INJECTION, SOLUTION INTRAMUSCULAR; INTRAVENOUS
Status: COMPLETED | OUTPATIENT
Start: 2021-05-11 | End: 2021-05-11

## 2021-05-11 RX ORDER — CARVEDILOL 12.5 MG/1
12.5 TABLET ORAL 2 TIMES DAILY WITH MEALS
Status: DISCONTINUED | OUTPATIENT
Start: 2021-05-11 | End: 2021-05-12 | Stop reason: HOSPADM

## 2021-05-11 RX ORDER — SODIUM CHLORIDE 0.9 % (FLUSH) 0.9 %
5-40 SYRINGE (ML) INJECTION EVERY 8 HOURS
Status: DISCONTINUED | OUTPATIENT
Start: 2021-05-11 | End: 2021-05-12 | Stop reason: HOSPADM

## 2021-05-11 RX ORDER — LIDOCAINE HYDROCHLORIDE 10 MG/ML
0.1 INJECTION INFILTRATION; PERINEURAL AS NEEDED
Status: DISCONTINUED | OUTPATIENT
Start: 2021-05-11 | End: 2021-05-11 | Stop reason: HOSPADM

## 2021-05-11 RX ORDER — SODIUM CHLORIDE 9 MG/ML
100 INJECTION, SOLUTION INTRAVENOUS CONTINUOUS
Status: DISCONTINUED | OUTPATIENT
Start: 2021-05-11 | End: 2021-05-12 | Stop reason: HOSPADM

## 2021-05-11 RX ORDER — DIPHENHYDRAMINE HCL 25 MG
25 CAPSULE ORAL
Status: DISCONTINUED | OUTPATIENT
Start: 2021-05-11 | End: 2021-05-12 | Stop reason: HOSPADM

## 2021-05-11 RX ORDER — FENTANYL CITRATE 50 UG/ML
100 INJECTION, SOLUTION INTRAMUSCULAR; INTRAVENOUS
Status: COMPLETED | OUTPATIENT
Start: 2021-05-11 | End: 2021-05-11

## 2021-05-11 RX ORDER — PROPOFOL 10 MG/ML
INJECTION, EMULSION INTRAVENOUS AS NEEDED
Status: DISCONTINUED | OUTPATIENT
Start: 2021-05-11 | End: 2021-05-11 | Stop reason: HOSPADM

## 2021-05-11 RX ORDER — SODIUM CHLORIDE, SODIUM LACTATE, POTASSIUM CHLORIDE, CALCIUM CHLORIDE 600; 310; 30; 20 MG/100ML; MG/100ML; MG/100ML; MG/100ML
75 INJECTION, SOLUTION INTRAVENOUS CONTINUOUS
Status: DISCONTINUED | OUTPATIENT
Start: 2021-05-11 | End: 2021-05-11 | Stop reason: HOSPADM

## 2021-05-11 RX ORDER — SODIUM CHLORIDE 0.9 % (FLUSH) 0.9 %
5-40 SYRINGE (ML) INJECTION AS NEEDED
Status: DISCONTINUED | OUTPATIENT
Start: 2021-05-11 | End: 2021-05-12 | Stop reason: HOSPADM

## 2021-05-11 RX ORDER — DIPHENHYDRAMINE HYDROCHLORIDE 50 MG/ML
12.5 INJECTION, SOLUTION INTRAMUSCULAR; INTRAVENOUS
Status: DISCONTINUED | OUTPATIENT
Start: 2021-05-11 | End: 2021-05-11 | Stop reason: HOSPADM

## 2021-05-11 RX ORDER — FLUMAZENIL 0.1 MG/ML
0.2 INJECTION INTRAVENOUS
Status: DISCONTINUED | OUTPATIENT
Start: 2021-05-11 | End: 2021-05-11 | Stop reason: HOSPADM

## 2021-05-11 RX ORDER — OXYCODONE HYDROCHLORIDE 5 MG/1
10 TABLET ORAL
Status: DISCONTINUED | OUTPATIENT
Start: 2021-05-11 | End: 2021-05-12 | Stop reason: HOSPADM

## 2021-05-11 RX ORDER — NALOXONE HYDROCHLORIDE 0.4 MG/ML
0.1 INJECTION, SOLUTION INTRAMUSCULAR; INTRAVENOUS; SUBCUTANEOUS
Status: DISCONTINUED | OUTPATIENT
Start: 2021-05-11 | End: 2021-05-11 | Stop reason: HOSPADM

## 2021-05-11 RX ORDER — DEXAMETHASONE SODIUM PHOSPHATE 100 MG/10ML
10 INJECTION INTRAMUSCULAR; INTRAVENOUS ONCE
Status: COMPLETED | OUTPATIENT
Start: 2021-05-12 | End: 2021-05-12

## 2021-05-11 RX ORDER — TRANEXAMIC ACID 100 MG/ML
INJECTION, SOLUTION INTRAVENOUS AS NEEDED
Status: DISCONTINUED | OUTPATIENT
Start: 2021-05-11 | End: 2021-05-11 | Stop reason: HOSPADM

## 2021-05-11 RX ORDER — ACETAMINOPHEN 650 MG/1
650 SUPPOSITORY RECTAL ONCE
Status: COMPLETED | OUTPATIENT
Start: 2021-05-11 | End: 2021-05-11

## 2021-05-11 RX ORDER — HALOPERIDOL 5 MG/ML
1 INJECTION INTRAMUSCULAR
Status: DISCONTINUED | OUTPATIENT
Start: 2021-05-11 | End: 2021-05-11 | Stop reason: HOSPADM

## 2021-05-11 RX ORDER — SODIUM CHLORIDE, SODIUM LACTATE, POTASSIUM CHLORIDE, CALCIUM CHLORIDE 600; 310; 30; 20 MG/100ML; MG/100ML; MG/100ML; MG/100ML
100 INJECTION, SOLUTION INTRAVENOUS CONTINUOUS
Status: DISCONTINUED | OUTPATIENT
Start: 2021-05-11 | End: 2021-05-11 | Stop reason: HOSPADM

## 2021-05-11 RX ORDER — HYDROMORPHONE HYDROCHLORIDE 1 MG/ML
1 INJECTION, SOLUTION INTRAMUSCULAR; INTRAVENOUS; SUBCUTANEOUS
Status: DISCONTINUED | OUTPATIENT
Start: 2021-05-11 | End: 2021-05-12 | Stop reason: HOSPADM

## 2021-05-11 RX ORDER — CELECOXIB 200 MG/1
200 CAPSULE ORAL EVERY 12 HOURS
Status: DISCONTINUED | OUTPATIENT
Start: 2021-05-11 | End: 2021-05-12 | Stop reason: HOSPADM

## 2021-05-11 RX ORDER — BUPIVACAINE HYDROCHLORIDE 7.5 MG/ML
INJECTION, SOLUTION EPIDURAL; RETROBULBAR
Status: COMPLETED | OUTPATIENT
Start: 2021-05-11 | End: 2021-05-11

## 2021-05-11 RX ORDER — CEFAZOLIN SODIUM/WATER 2 G/20 ML
2 SYRINGE (ML) INTRAVENOUS ONCE
Status: DISCONTINUED | OUTPATIENT
Start: 2021-05-11 | End: 2021-05-11

## 2021-05-11 RX ORDER — AMOXICILLIN 250 MG
2 CAPSULE ORAL DAILY
Status: DISCONTINUED | OUTPATIENT
Start: 2021-05-12 | End: 2021-05-12 | Stop reason: HOSPADM

## 2021-05-11 RX ORDER — MIDAZOLAM HYDROCHLORIDE 1 MG/ML
2 INJECTION, SOLUTION INTRAMUSCULAR; INTRAVENOUS
Status: DISCONTINUED | OUTPATIENT
Start: 2021-05-11 | End: 2021-05-11 | Stop reason: HOSPADM

## 2021-05-11 RX ORDER — OXYCODONE HYDROCHLORIDE 5 MG/1
5 TABLET ORAL
Status: DISCONTINUED | OUTPATIENT
Start: 2021-05-11 | End: 2021-05-12 | Stop reason: HOSPADM

## 2021-05-11 RX ORDER — ASPIRIN 81 MG/1
81 TABLET ORAL EVERY 12 HOURS
Status: DISCONTINUED | OUTPATIENT
Start: 2021-05-11 | End: 2021-05-12 | Stop reason: HOSPADM

## 2021-05-11 RX ORDER — INSULIN GLARGINE 100 [IU]/ML
15 INJECTION, SOLUTION SUBCUTANEOUS
Status: DISCONTINUED | OUTPATIENT
Start: 2021-05-11 | End: 2021-05-12 | Stop reason: HOSPADM

## 2021-05-11 RX ORDER — ACETAMINOPHEN 500 MG
1000 TABLET ORAL ONCE
Status: COMPLETED | OUTPATIENT
Start: 2021-05-11 | End: 2021-05-11

## 2021-05-11 RX ORDER — OXYCODONE HYDROCHLORIDE 5 MG/1
5 TABLET ORAL
Status: DISCONTINUED | OUTPATIENT
Start: 2021-05-11 | End: 2021-05-11 | Stop reason: HOSPADM

## 2021-05-11 RX ORDER — PROPOFOL 10 MG/ML
INJECTION, EMULSION INTRAVENOUS
Status: DISCONTINUED | OUTPATIENT
Start: 2021-05-11 | End: 2021-05-11 | Stop reason: HOSPADM

## 2021-05-11 RX ORDER — NALOXONE HYDROCHLORIDE 0.4 MG/ML
.2-.4 INJECTION, SOLUTION INTRAMUSCULAR; INTRAVENOUS; SUBCUTANEOUS
Status: DISCONTINUED | OUTPATIENT
Start: 2021-05-11 | End: 2021-05-12 | Stop reason: HOSPADM

## 2021-05-11 RX ORDER — LIDOCAINE HYDROCHLORIDE 20 MG/ML
INJECTION, SOLUTION EPIDURAL; INFILTRATION; INTRACAUDAL; PERINEURAL AS NEEDED
Status: DISCONTINUED | OUTPATIENT
Start: 2021-05-11 | End: 2021-05-11 | Stop reason: HOSPADM

## 2021-05-11 RX ORDER — ACETAMINOPHEN 325 MG/1
975 TABLET ORAL ONCE
Status: COMPLETED | OUTPATIENT
Start: 2021-05-11 | End: 2021-05-11

## 2021-05-11 RX ORDER — HYDROMORPHONE HYDROCHLORIDE 1 MG/ML
0.5 INJECTION, SOLUTION INTRAMUSCULAR; INTRAVENOUS; SUBCUTANEOUS
Status: DISCONTINUED | OUTPATIENT
Start: 2021-05-11 | End: 2021-05-11 | Stop reason: HOSPADM

## 2021-05-11 RX ORDER — ROPIVACAINE HYDROCHLORIDE 2 MG/ML
INJECTION, SOLUTION EPIDURAL; INFILTRATION; PERINEURAL AS NEEDED
Status: DISCONTINUED | OUTPATIENT
Start: 2021-05-11 | End: 2021-05-11 | Stop reason: HOSPADM

## 2021-05-11 RX ADMIN — PROPOFOL 140 MCG/KG/MIN: 10 INJECTION, EMULSION INTRAVENOUS at 12:51

## 2021-05-11 RX ADMIN — CEFAZOLIN 3 G: 1 INJECTION, POWDER, FOR SOLUTION INTRAVENOUS at 13:01

## 2021-05-11 RX ADMIN — ASPIRIN 81 MG: 81 TABLET ORAL at 20:33

## 2021-05-11 RX ADMIN — OXYCODONE 5 MG: 5 TABLET ORAL at 17:40

## 2021-05-11 RX ADMIN — INSULIN GLARGINE 15 UNITS: 100 INJECTION, SOLUTION SUBCUTANEOUS at 21:29

## 2021-05-11 RX ADMIN — FAMOTIDINE 20 MG: 10 INJECTION INTRAVENOUS at 10:29

## 2021-05-11 RX ADMIN — FENTANYL CITRATE 50 MCG: 50 INJECTION, SOLUTION INTRAMUSCULAR; INTRAVENOUS at 10:58

## 2021-05-11 RX ADMIN — Medication 5 ML: at 20:33

## 2021-05-11 RX ADMIN — PHENYLEPHRINE HYDROCHLORIDE 120 MCG: 10 INJECTION INTRAVENOUS at 12:56

## 2021-05-11 RX ADMIN — BUPIVACAINE HYDROCHLORIDE 13.5 MG: 7.5 INJECTION, SOLUTION EPIDURAL; RETROBULBAR at 12:47

## 2021-05-11 RX ADMIN — CEFAZOLIN 3 G: 10 INJECTION, POWDER, FOR SOLUTION INTRAVENOUS at 20:33

## 2021-05-11 RX ADMIN — ACETAMINOPHEN 1000 MG: 500 TABLET ORAL at 23:55

## 2021-05-11 RX ADMIN — PHENYLEPHRINE HYDROCHLORIDE 180 MCG: 10 INJECTION INTRAVENOUS at 14:05

## 2021-05-11 RX ADMIN — PHENYLEPHRINE HYDROCHLORIDE 120 MCG: 10 INJECTION INTRAVENOUS at 13:24

## 2021-05-11 RX ADMIN — Medication 1 AMPULE: at 20:33

## 2021-05-11 RX ADMIN — PHENYLEPHRINE HYDROCHLORIDE 120 MCG: 10 INJECTION INTRAVENOUS at 13:12

## 2021-05-11 RX ADMIN — PHENYLEPHRINE HYDROCHLORIDE 120 MCG: 10 INJECTION INTRAVENOUS at 12:59

## 2021-05-11 RX ADMIN — MIDAZOLAM 2 MG: 1 INJECTION INTRAMUSCULAR; INTRAVENOUS at 10:58

## 2021-05-11 RX ADMIN — ACETAMINOPHEN 1000 MG: 500 TABLET ORAL at 10:31

## 2021-05-11 RX ADMIN — PHENYLEPHRINE HYDROCHLORIDE 120 MCG: 10 INJECTION INTRAVENOUS at 13:41

## 2021-05-11 RX ADMIN — TRANEXAMIC ACID 1000 MG: 100 INJECTION, SOLUTION INTRAVENOUS at 14:18

## 2021-05-11 RX ADMIN — PHENYLEPHRINE HYDROCHLORIDE 120 MCG: 10 INJECTION INTRAVENOUS at 13:35

## 2021-05-11 RX ADMIN — ROPIVACAINE HYDROCHLORIDE 20 ML: 2 INJECTION, SOLUTION EPIDURAL; INFILTRATION at 11:02

## 2021-05-11 RX ADMIN — LIDOCAINE HYDROCHLORIDE 30 MG: 20 INJECTION, SOLUTION EPIDURAL; INFILTRATION; INTRACAUDAL; PERINEURAL at 12:50

## 2021-05-11 RX ADMIN — SODIUM CHLORIDE, SODIUM LACTATE, POTASSIUM CHLORIDE, AND CALCIUM CHLORIDE: 600; 310; 30; 20 INJECTION, SOLUTION INTRAVENOUS at 14:21

## 2021-05-11 RX ADMIN — SODIUM CHLORIDE, SODIUM LACTATE, POTASSIUM CHLORIDE, AND CALCIUM CHLORIDE 100 ML/HR: 600; 310; 30; 20 INJECTION, SOLUTION INTRAVENOUS at 10:23

## 2021-05-11 RX ADMIN — TRANEXAMIC ACID 1000 MG: 100 INJECTION, SOLUTION INTRAVENOUS at 13:02

## 2021-05-11 RX ADMIN — PHENYLEPHRINE HYDROCHLORIDE 180 MCG: 10 INJECTION INTRAVENOUS at 14:20

## 2021-05-11 RX ADMIN — PHENYLEPHRINE HYDROCHLORIDE 120 MCG: 10 INJECTION INTRAVENOUS at 13:07

## 2021-05-11 RX ADMIN — Medication 3 AMPULE: at 10:27

## 2021-05-11 RX ADMIN — OXYCODONE 5 MG: 5 TABLET ORAL at 21:33

## 2021-05-11 RX ADMIN — CELECOXIB 200 MG: 200 CAPSULE ORAL at 17:41

## 2021-05-11 RX ADMIN — ACETAMINOPHEN 975 MG: 325 TABLET ORAL at 17:41

## 2021-05-11 RX ADMIN — PROPOFOL 50 MG: 10 INJECTION, EMULSION INTRAVENOUS at 12:50

## 2021-05-11 RX ADMIN — Medication 10 ML: at 17:41

## 2021-05-11 RX ADMIN — PHENYLEPHRINE HYDROCHLORIDE 120 MCG: 10 INJECTION INTRAVENOUS at 13:38

## 2021-05-11 RX ADMIN — SODIUM CHLORIDE 100 ML/HR: 900 INJECTION, SOLUTION INTRAVENOUS at 16:29

## 2021-05-11 RX ADMIN — CELECOXIB 200 MG: 200 CAPSULE ORAL at 10:33

## 2021-05-11 RX ADMIN — PHENYLEPHRINE HYDROCHLORIDE 180 MCG: 10 INJECTION INTRAVENOUS at 13:43

## 2021-05-11 RX ADMIN — CARVEDILOL 12.5 MG: 12.5 TABLET, FILM COATED ORAL at 17:41

## 2021-05-11 NOTE — H&P
Pre Operative History and Physical Exam    Patient ID:  Vinod Douglass  [id number]  12 y.o.  1970    Today: May 11, 2021       Assessment:   1. Arthritis of the right knee        Plan:    1. Proceed with scheduled Procedure(s) (LRB):  RIGHT KNEE REPLACEMENT TOTAL PT CHOICE / SPINAL (Right)            CC:  Right knee pain    HPI:   The patient has  Right knee arthritis. The patient was evaluated and examined during a consultation prior to this office visit. There have been no changes to the patient's orthopedic condition since the initial consultation. The patient has failed previous conservative treatment for this condition. The patient will present the day of surgery for Procedure(s) (LRB):  RIGHT KNEE REPLACEMENT TOTAL PT CHOICE / SPINAL (Right)      Past Medical/Surgical History:  Past Medical History:   Diagnosis Date    Diabetes ([address]     checks BID, normal 120, hyposymptoms at 61    Hypertension      Past Surgical History:   Procedure Laterality Date    HX COLONOSCOPY      HX KNEE ARTHROSCOPY          Allergies: No Known Allergies     Physical Exam:   General: NAD, Alert, Oriented, Appears their stated age     [de-identified]: NC/AT, PERRL    Skin: No rashes, lesions or wounds seen      Psych: normal affect      Heart: Regular Rate, Rhythm     Lungs: unlabored respirations, normal breath sounds     Abdomen: Soft and non-distended     Ortho:  Right knee joint line tenderness    Neuro: no focal defects, sensation is equal bilaterally     Lymph: no lymphadenopathy     Meds:   No current facility-administered medications for this encounter. Current Outpatient Medications   Medication Sig    losartan 50 mg tab 100 mg, hydroCHLOROthiazide 25 mg tab 25 mg Take  by mouth daily.  dapagliflozin (Farxiga) 5 mg tab tablet Take 5 mg by mouth daily.  semaglutide (Ozempic) 1 mg/dose (2 mg/1.5 mL) sub-q pen 1 mg by SubCUTAneous route every seven (7) days.     carvediloL (COREG) 12.5 mg tablet Take 12.5 mg by mouth two (2) times daily (with meals).  insulin glargine (Lantus Solostar U-100 Insulin) 100 unit/mL (3 mL) inpn 15 Units by SubCUTAneous route nightly. Labs:  Hospital Outpatient Visit on 05/04/2021   Component Date Value Ref Range Status    WBC 05/04/2021 6.9  4.3 - 11.1 K/uL Final    RBC 05/04/2021 5.76* 4.23 - 5.6 M/uL Final    HGB 05/04/2021 14.4  13.6 - 17.2 g/dL Final    HCT 05/04/2021 44.1  41.1 - 50.3 % Final    MCV 05/04/2021 76.6* 79.6 - 97.8 FL Final    MCH 05/04/2021 25.0* 26.1 - 32.9 PG Final    MCHC 05/04/2021 32.7  31.4 - 35.0 g/dL Final    RDW 05/04/2021 15.0* 11.9 - 14.6 % Final    PLATELET 06/16/9236 377  150 - 450 K/uL Final    MPV 05/04/2021 11.1  9.4 - 12.3 FL Final    ABSOLUTE NRBC 05/04/2021 0.00  0.0 - 0.2 K/uL Final    **Note: Absolute NRBC parameter is now reported with Hemogram**    DF 05/04/2021 AUTOMATED    Final    NEUTROPHILS 05/04/2021 60  43 - 78 % Final    LYMPHOCYTES 05/04/2021 24  13 - 44 % Final    MONOCYTES 05/04/2021 11  4.0 - 12.0 % Final    EOSINOPHILS 05/04/2021 3  0.5 - 7.8 % Final    BASOPHILS 05/04/2021 1  0.0 - 2.0 % Final    IMMATURE GRANULOCYTES 05/04/2021 1  0.0 - 5.0 % Final    ABS. NEUTROPHILS 05/04/2021 4.1  1.7 - 8.2 K/UL Final    ABS. LYMPHOCYTES 05/04/2021 1.7  0.5 - 4.6 K/UL Final    ABS. MONOCYTES 05/04/2021 0.7  0.1 - 1.3 K/UL Final    ABS. EOSINOPHILS 05/04/2021 0.2  0.0 - 0.8 K/UL Final    ABS. BASOPHILS 05/04/2021 0.1  0.0 - 0.2 K/UL Final    ABS. IMM. GRANS. 05/04/2021 0.0  0.0 - 0.5 K/UL Final    Sodium 05/04/2021 140  136 - 145 mmol/L Final    Potassium 05/04/2021 3.8  3.5 - 5.1 mmol/L Final    Chloride 05/04/2021 106  98 - 107 mmol/L Final    CO2 05/04/2021 30  21 - 32 mmol/L Final    Anion gap 05/04/2021 4* 7 - 16 mmol/L Final    Glucose 05/04/2021 75  65 - 100 mg/dL Final    Comment: 47 - 60 mg/dl Consistent with, but not fully diagnostic of hypoglycemia.   101 - 125 mg/dl Impaired fasting glucose/consistent with pre-diabetes mellitus  > 126 mg/dl Fasting glucose consistent with overt diabetes mellitus      BUN 05/04/2021 14  6 - 23 MG/DL Final    Creatinine 05/04/2021 1.18  0.8 - 1.5 MG/DL Final    GFR est AA 05/04/2021 >60  >60 ml/min/1.73m2 Final    GFR est non-AA 05/04/2021 >60  >60 ml/min/1.73m2 Final    Comment: (NOTE)  Estimated GFR is calculated using the Modification of Diet in Renal   Disease (MDRD) Study equation, reported for both  Americans   (GFRAA) and non- Americans (GFRNA), and normalized to 1.73m2   body surface area. The physician must decide which value applies to   the patient. The MDRD study equation should only be used in   individuals age 25 or older. It has not been validated for the   following: pregnant women, patients with serious comorbid conditions,   or on certain medications, or persons with extremes of body size,   muscle mass, or nutritional status.  Calcium 05/04/2021 9.8  8.3 - 10.4 MG/DL Final    Special Requests: 05/04/2021 NO SPECIAL REQUESTS    Final    Culture result: 05/04/2021 SA target not detected. A MRSA NEGATIVE, SA NEGATIVE test result does not preclude MRSA or SA nasal colonization. Final    Glucose (POC) 05/04/2021 77  65 - 100 mg/dL Final    Comment: 47 - 60 mg/dl Consistent with, but not fully diagnostic of hypoglycemia. 101 - 125 mg/dl Impaired fasting glucose/consistent with pre-diabetes mellitus  > 126 mg/dl Fasting glucose consistent with overt diabetes mellitus      Performed by 05/04/2021 Kelly Matos   Final   Orders Only on 04/26/2021   Component Date Value Ref Range Status    SARS-CoV-2, LULY 05/04/2021 Not Detected  Not Detected Final    Comment: This nucleic acid amplification test was developed and its performance  characteristics determined by [facility]. Nucleic acid  amplification tests include RT-PCR and TMA. This test has not been  FDA cleared or approved.  This test has been authorized by FDA under  an Emergency Use Authorization (EUA). This test is only authorized  for the duration of time the declaration that circumstances exist  justifying the authorization of the emergency use of in vitro  diagnostic tests for detection of SARS-CoV-2 virus and/or diagnosis  of COVID-19 infection under section 564(b)(1) of the Act, 21 U. S.C.  315MBB-3(B) (1), unless the authorization is terminated or revoked  sooner. When diagnostic testing is negative, the possibility of a false  negative result should be considered in the context of a patient's  recent exposures and the presence of clinical signs and symptoms  consistent with COVID-19. An individual without symptoms of COVID-19  and who is not shedding SARS-CoV-2 virus wo                           uld expect to have a  negative (not detected) result in this assay. [facility] 05/04/2021 Comment   Final    Received    SARS-CoV-2, LULY 2 DAY TAT 05/04/2021 Performed   Final   Hospital Outpatient Visit on 03/24/2021   Component Date Value Ref Range Status    BODY FLUID TYPE 03/24/2021 RIGHT    Final    KNEE    FLUID COLOR 03/24/2021 YELLOW    Final    FLUID APPEARANCE 03/24/2021 CLEAR    Final    FLUID RBC CT. 03/24/2021 <1,000  /cu mm Final    FLUID WBC COUNT 03/24/2021 383  /cu mm Final    [facility] NEUTROPHIL 03/24/2021 4  % Final    [facility] LYMPHS 03/24/2021 94  % Final    [facility] MACROPHAGES 03/24/2021 2  % Final    FLUID TYPE(7) 03/24/2021 RIGHT    Final    KNEE    Crystals, body fluid 03/24/2021 SMEAR SENT TO PATHOLOGIST    Final    Comment: (NOTE)  SIGNIFICANT NUMBERS OF NEGATIVELY BIREFRINGENT NEEDLE SHAPED CRYSTALS   CONSISTENT WITH URIC ACID ARE NOT IDENTIFIED UNDER POLARIZED LIGHT    PER DR. Geraldo Fox      Special Requests: 03/24/2021 NO SPECIAL REQUESTS    Final    GRAM STAIN 03/24/2021 0 TO 1 WBC'S/OIF   Final    GRAM STAIN 03/24/2021 NO DEFINITE ORGANISM SEEN    Final    Culture result: 03/24/2021 NO GROWTH 2 DAYS    Final There is no problem list on file for this patient.         Signed By: Curt Almonte MD  May 11, 2021

## 2021-05-11 NOTE — ANESTHESIA PREPROCEDURE EVALUATION
Relevant Problems   ENDOCRINE   (+) Arthritis of right knee       Anesthetic History   No history of anesthetic complications            Review of Systems / Medical History  Patient summary reviewed and pertinent labs reviewed    Pulmonary        Sleep apnea: No treatment           Neuro/Psych   Within defined limits           Cardiovascular    Hypertension: well controlled              Exercise tolerance: >4 METS  Comments: Stress Test 7/2020 showing normal LVEF and no inducible ischemia   GI/Hepatic/Renal  Within defined limits              Endo/Other    Diabetes (HgB A1c of 6.2): well controlled    Morbid obesity and arthritis     Other Findings            Physical Exam    Airway  Mallampati: III  TM Distance: > 6 cm  Neck ROM: normal range of motion, short neck   Mouth opening: Normal     Cardiovascular  Regular rate and rhythm,  S1 and S2 normal,  no murmur, click, rub, or gallop             Dental  No notable dental hx       Pulmonary  Breath sounds clear to auscultation               Abdominal  GI exam deferred       Other Findings            Anesthetic Plan    ASA: 3  Anesthesia type: spinal      Post-op pain plan if not by surgeon: peripheral nerve block single      Anesthetic plan and risks discussed with: Patient

## 2021-05-11 NOTE — ANESTHESIA PROCEDURE NOTES
Spinal Block    Start time: 5/11/2021 12:40 PM  End time: 5/11/2021 12:47 PM  Performed by: Leona Ferris MD  Authorized by: Leona Ferris MD     Pre-procedure:   Indications: primary anesthetic  Preanesthetic Checklist: patient identified, risks and benefits discussed, anesthesia consent, patient being monitored and timeout performed    Timeout Time: 12:38          Spinal Block:   Patient Position:  Seated  Prep Region:  Lumbar  Prep: chlorhexidine and patient draped      Location:  L3-4  Technique:  Single shot    Local Dose (mL):  2    Needle:   Needle Type:  Pencan  Needle Gauge:  25 G  Attempts:  2      Events: CSF confirmed, no blood with aspiration and no paresthesia        Assessment:  Insertion:  Uncomplicated  Patient tolerance:  Patient tolerated the procedure well with no immediate complications

## 2021-05-11 NOTE — PROGRESS NOTES
TRANSFER - IN REPORT:    Verbal report received from Summer on Dakota Santiago  being received from PACU for transfer of care  Report consisted of patients Situation, Background, Assessment and   Recommendations(SBAR). Information from the following report(s) SBAR was reviewed with the receiving nurse. Opportunity for questions and clarification was provided. Assessment completed upon patients arrival to unit and care assumed.

## 2021-05-11 NOTE — ANESTHESIA POSTPROCEDURE EVALUATION
Procedure(s):  RIGHT KNEE REPLACEMENT TOTAL . spinal    Anesthesia Post Evaluation      Multimodal analgesia: multimodal analgesia used between 6 hours prior to anesthesia start to PACU discharge  Patient location during evaluation: PACU  Patient participation: complete - patient participated  Level of consciousness: awake and alert  Pain score: 0  Pain management: adequate  Airway patency: patent  Anesthetic complications: no  Cardiovascular status: acceptable and hemodynamically stable  Respiratory status: acceptable and spontaneous ventilation  Hydration status: acceptable  Post anesthesia nausea and vomiting:  none  Final Post Anesthesia Temperature Assessment:  Normothermia (36.0-37.5 degrees C)      INITIAL Post-op Vital signs:   Vitals Value Taken Time   /90 05/11/21 1527   Temp 36.6 °C (97.8 °F) 05/11/21 1451   Pulse 77 05/11/21 1528   Resp 15 05/11/21 1527   SpO2 100 % 05/11/21 1528   Vitals shown include unvalidated device data.

## 2021-05-11 NOTE — PROGRESS NOTES
05/11/21 1615   Dual Skin Pressure Injury Assessment   Dual Skin Pressure Injury Assessment WDL   Second Care Provider (Based on [address] [place] [address]) [facility] RN   Skin Integumentary   Skin Integumentary (WDL) X    Pressure  Injury Documentation No Pressure Injury Noted-Pressure Ulcer Prevention Initiated   Skin Color Appropriate for ethnicity   Skin Condition/Temp Dry; Warm   Skin Integrity Incision (comment)  (R knee)   Wound Prevention and Protection Methods   Orientation of Wound Prevention Posterior   Location of Wound Prevention Sacrum/Coccyx   Dressing Present  No   Wound Offloading (Prevention Methods) Bed, pressure reduction mattress

## 2021-05-11 NOTE — PERIOP NOTES
TRANSFER - OUT REPORT:    Verbal report given to Krupa Vanegas RN on Richy Kitchen  being transferred to  for routine post - op       Report consisted of patients Situation, Background, Assessment and   Recommendations(SBAR). Information from the following report(s) SBAR, OR Summary, Procedure Summary, Intake/Output, MAR and Recent Results was reviewed with the receiving nurse. Lines:   Peripheral IV 05/11/21 Left;Posterior Hand (Active)   Site Assessment Clean, dry, & intact 05/11/21 1451   Phlebitis Assessment 0 05/11/21 1451   Infiltration Assessment 0 05/11/21 1451   Dressing Status Clean, dry, & intact 05/11/21 1451   Dressing Type Tape;Transparent 05/11/21 1451   Hub Color/Line Status Patent 05/11/21 1451        Opportunity for questions and clarification was provided. Patient transported with:   O2 @ 3 liters    VTE prophylaxis orders have been written for Richy Kitchen. Patient and family given floor number and nurses name. Family updated re: pt status after security code verified.

## 2021-05-11 NOTE — ANESTHESIA PROCEDURE NOTES
Peripheral Block    Start time: 5/11/2021 11:00 AM  End time: 5/11/2021 11:02 AM  Performed by: Reza Sun MD  Authorized by: Reza Sun MD       Pre-procedure: Indications: at surgeon's request and post-op pain management    Preanesthetic Checklist: patient identified, risks and benefits discussed, site marked, timeout performed, anesthesia consent given and patient being monitored    Timeout Time: 10:58          Block Type:   Block Type: Adductor canal  Laterality:  Right  Monitoring:  Standard ASA monitoring, continuous pulse ox, frequent vital sign checks, heart rate, responsive to questions and oxygen  Injection Technique:  Single shot  Procedures: ultrasound guided    Patient Position: supine  Prep: chlorhexidine    Location:  Mid thigh  Needle Type:  Stimuplex  Needle Gauge:  22 G  Needle Localization:  Ultrasound guidance  Medication Injected:  Ropivacaine 0.2% with epinephrine 1:200,000 injection, 20 mL (Mixture components: ropivacaine 2 mg/mL (0.2 %) Soln, 1 mL; EPINEPHrine HCl (PF) 1 mg/mL (1 mL) Soln, . 005 mL)  Med Admin Time: 5/11/2021 11:02 AM    Assessment:  Number of attempts:  1  Injection Assessment:  Incremental injection every 5 mL, local visualized surrounding nerve on ultrasound, negative aspiration for CSF, negative aspiration for blood, no paresthesia, no intravascular symptoms and ultrasound image on chart  Patient tolerance:  Patient tolerated the procedure well with no immediate complications

## 2021-05-12 ENCOUNTER — HOME HEALTH ADMISSION (OUTPATIENT)
Dept: HOME HEALTH SERVICES | Facility: HOME HEALTH | Age: 51
End: 2021-05-12
Payer: COMMERCIAL

## 2021-05-12 VITALS
WEIGHT: 282.8 LBS | DIASTOLIC BLOOD PRESSURE: 98 MMHG | TEMPERATURE: 98.1 F | HEIGHT: 69 IN | SYSTOLIC BLOOD PRESSURE: 143 MMHG | OXYGEN SATURATION: 96 % | BODY MASS INDEX: 41.89 KG/M2 | HEART RATE: 81 BPM | RESPIRATION RATE: 18 BRPM

## 2021-05-12 LAB
GLUCOSE BLD STRIP.AUTO-MCNC: 130 MG/DL (ref 65–100)
GLUCOSE BLD STRIP.AUTO-MCNC: 173 MG/DL (ref 65–100)
HGB BLD-MCNC: 12.3 G/DL (ref 13.6–17.2)
SERVICE CMNT-IMP: ABNORMAL
SERVICE CMNT-IMP: ABNORMAL

## 2021-05-12 PROCEDURE — 36415 COLL VENOUS BLD VENIPUNCTURE: CPT

## 2021-05-12 PROCEDURE — 74011250636 HC RX REV CODE- 250/636: Performed by: ORTHOPAEDIC SURGERY

## 2021-05-12 PROCEDURE — 2709999900 HC NON-CHARGEABLE SUPPLY

## 2021-05-12 PROCEDURE — 99218 HC RM OBSERVATION: CPT

## 2021-05-12 PROCEDURE — 85018 HEMOGLOBIN: CPT

## 2021-05-12 PROCEDURE — 74011000302 HC RX REV CODE- 302: Performed by: ORTHOPAEDIC SURGERY

## 2021-05-12 PROCEDURE — 97161 PT EVAL LOW COMPLEX 20 MIN: CPT

## 2021-05-12 PROCEDURE — 74011000250 HC RX REV CODE- 250: Performed by: ORTHOPAEDIC SURGERY

## 2021-05-12 PROCEDURE — 97110 THERAPEUTIC EXERCISES: CPT

## 2021-05-12 PROCEDURE — 97535 SELF CARE MNGMENT TRAINING: CPT

## 2021-05-12 PROCEDURE — 74011250637 HC RX REV CODE- 250/637: Performed by: ORTHOPAEDIC SURGERY

## 2021-05-12 PROCEDURE — 97165 OT EVAL LOW COMPLEX 30 MIN: CPT

## 2021-05-12 PROCEDURE — 86580 TB INTRADERMAL TEST: CPT | Performed by: ORTHOPAEDIC SURGERY

## 2021-05-12 PROCEDURE — 82962 GLUCOSE BLOOD TEST: CPT

## 2021-05-12 PROCEDURE — 97530 THERAPEUTIC ACTIVITIES: CPT

## 2021-05-12 PROCEDURE — 97112 NEUROMUSCULAR REEDUCATION: CPT

## 2021-05-12 RX ORDER — OXYCODONE HYDROCHLORIDE 5 MG/1
5 TABLET ORAL
Qty: 40 TAB | Refills: 0 | Status: SHIPPED | OUTPATIENT
Start: 2021-05-12 | End: 2021-05-19

## 2021-05-12 RX ORDER — ONDANSETRON 2 MG/ML
4 INJECTION INTRAMUSCULAR; INTRAVENOUS
Status: DISCONTINUED | OUTPATIENT
Start: 2021-05-12 | End: 2021-05-12 | Stop reason: HOSPADM

## 2021-05-12 RX ORDER — ASPIRIN 81 MG/1
81 TABLET ORAL EVERY 12 HOURS
Qty: 60 TAB | Refills: 0 | Status: SHIPPED | OUTPATIENT
Start: 2021-05-12 | End: 2021-06-03

## 2021-05-12 RX ADMIN — SODIUM CHLORIDE 100 ML/HR: 900 INJECTION, SOLUTION INTRAVENOUS at 04:39

## 2021-05-12 RX ADMIN — ACETAMINOPHEN 1000 MG: 500 TABLET ORAL at 04:39

## 2021-05-12 RX ADMIN — CARVEDILOL 12.5 MG: 12.5 TABLET, FILM COATED ORAL at 08:48

## 2021-05-12 RX ADMIN — DEXAMETHASONE SODIUM PHOSPHATE 10 MG: 10 INJECTION, SOLUTION INTRAMUSCULAR; INTRAVENOUS at 12:21

## 2021-05-12 RX ADMIN — ONDANSETRON 4 MG: 2 INJECTION INTRAMUSCULAR; INTRAVENOUS at 12:21

## 2021-05-12 RX ADMIN — CEFAZOLIN 3 G: 10 INJECTION, POWDER, FOR SOLUTION INTRAVENOUS at 04:39

## 2021-05-12 RX ADMIN — Medication 1 AMPULE: at 08:48

## 2021-05-12 RX ADMIN — SENNOSIDES AND DOCUSATE SODIUM 2 TABLET: 8.6; 5 TABLET ORAL at 08:48

## 2021-05-12 RX ADMIN — ACETAMINOPHEN 1000 MG: 500 TABLET ORAL at 12:21

## 2021-05-12 RX ADMIN — Medication 5 ML: at 04:39

## 2021-05-12 RX ADMIN — ASPIRIN 81 MG: 81 TABLET ORAL at 08:48

## 2021-05-12 RX ADMIN — OXYCODONE 5 MG: 5 TABLET ORAL at 08:47

## 2021-05-12 RX ADMIN — TUBERCULIN PURIFIED PROTEIN DERIVATIVE 5 UNITS: 5 INJECTION, SOLUTION INTRADERMAL at 08:48

## 2021-05-12 RX ADMIN — HYDROCHLOROTHIAZIDE: 25 TABLET ORAL at 08:48

## 2021-05-12 RX ADMIN — CELECOXIB 200 MG: 200 CAPSULE ORAL at 04:39

## 2021-05-12 NOTE — PROGRESS NOTES
ACUTE PHYSICAL THERAPY GOALS:  (Developed with and agreed upon by patient and/or caregiver. )  LT. Pt will be able to perform bed mobility and transfers with modified independence in order to be able to safely function within their home within 7 treatment days. 2. Pt will be able to ambulate a minimum of 500 ft with modified independence and use of least restrictive device in order to return to home and community ambulation within 7 treatment days. 3. Pt will demonstrate good standing and normal sitting balance with modified independence in order to safely perform functional mobility and ADLS within 7 treatment days. 4. Pt knee flexion will measure 90 degrees within 7 treatment days.          WBAT RLE  PHYSICAL THERAPY: Daily Note and PM Treatment Day # 1    Hernesto Manning is a 45 y.o. male   PRIMARY DIAGNOSIS: <principal problem not specified>  Arthritis of right knee [M17.11]  Procedure(s) (LRB):  RIGHT KNEE REPLACEMENT TOTAL  (Right)  1 Day Post-Op    ASSESSMENT:     REHAB RECOMMENDATIONS: CURRENT LEVEL OF FUNCTION:  (Most Recently Demonstrated)   Recommendation to date pending progress:  Setting:   Outpatient Therapy  Equipment:    Rolling Walker Bed Mobility:   Minimal Assistance  Sit to Stand:  [facility] [employer] Assistance  Transfers:   Standby Assistance  Gait/Mobility:   Standby Assistance     ASSESSMENT:  Mr. Ariana Yo presents seated up in chair and is agreeable to PT. Pt was able to ambualte 250' again this afternoon with improved step length on the LLE as well as improved stability and balance with use of the RW. Pt demonstrates improvements in mobility but continues to be limited by pain. Pt required AAROM for RLE heel slides and ABD slides, stating that his leg just feels really heavy and difficult to move. Pt exercises reviewed and performed and importance of knee extension reiterated to pt.  PT will continue to follow to maximize rehab potential.      SUBJECTIVE:   Mr. Ariana Yo states, \"I'm not doing so great. \"    SOCIAL HISTORY/ LIVING ENVIRONMENT: refer to al     OBJECTIVE:     PAIN: VITAL SIGNS: LINES/DRAINS:   Pre Treatment: Pain Screen  Pain Scale 1: Numeric (0 - 10)  Pain Intensity 1: 6  Pain Onset 1: post op  Pain Location 1: Knee  Pain Orientation 1: Right  Post Treatment: 8   none  O2 Device: Nasal cannula     MOBILITY: I Mod I S SBA CGA Min Mod Max Total  NT x2 Comments:   Bed Mobility    Rolling [] [] [] [] [] [] [] [] [] [x] []    Supine to Sit [] [] [] [] [] [] [] [] [] [x] []    Scooting [] [] [] [] [] [] [] [] [] [x] []    Sit to Supine [] [] [] [] [] [x] [] [] [] [] [] For RLE   Transfers    Sit to Stand [] [] [] [x] [] [] [] [] [] [] []    Bed to Chair [] [] [] [] [] [] [] [] [] [x] []    Stand to Sit [] [] [] [x] [] [] [] [] [] [] []    I=Independent, Mod I=Modified Independent, S=Supervision, SBA=Standby Assistance, CGA=Contact Guard Assistance,   Min=Minimal Assistance, Mod=Moderate Assistance, Max=Maximal Assistance, Total=Total Assistance, NT=Not Tested    BALANCE: Good Fair+ Fair Fair- Poor NT Comments   Sitting Static [x] [] [] [] [] []    Sitting Dynamic [x] [] [] [] [] []              Standing Static [] [x] [] [] [] []    Standing Dynamic [] [] [x] [] [] []      GAIT: I Mod I S SBA CGA Min Mod Max Total  NT x2 Comments:   Level of Assistance [] [] [] [x] [] [] [] [] [] [] []    Distance 250'    DME Rolling Walker    Gait Quality Decreased gait speed, antalgic gait    Weightbearing  Status WBAT     I=Independent, Mod I=Modified Independent, S=Supervision, SBA=Standby Assistance, CGA=Contact Guard Assistance,   Min=Minimal Assistance, Mod=Moderate Assistance, Max=Maximal Assistance, Total=Total Assistance, NT=Not Tested    PLAN:   FREQUENCY/DURATION: PT Plan of Care: BID for duration of hospital stay or until stated goals are met, whichever comes first.  TREATMENT:     TREATMENT:   (     )  Therapeutic Activity (15 Minutes):  Therapeutic activity included Sit to Supine, Transfer Training, Ambulation on level ground, Sitting balance  and Standing balance to improve functional Mobility, Strength and Activity tolerance. Therapeutic Exercise (17 Minutes): Therapeutic exercises noted below to improve functional activity tolerance, AROM, strength and mobility.      TREATMENT GRID:     Date:  5/12/21 Date:   Date:     ACTIVITY/EXERCISE AM PM AM PM AM PM   Heel slides  x10B       Quad sets  x10B       ABD slides  x10B       SAQ  x10B       AP  x10B       glute sets  x10B                B = bilateral; AA = active assistive; A = active; P = passive    AFTER TREATMENT POSITION/PRECAUTIONS:  Bed, Needs within reach and Visitors at bedside    INTERDISCIPLINARY COLLABORATION:  RN/PCT and PT/PTA    TOTAL TREATMENT DURATION:  PT Patient Time In/Time Out  Time In: 1311  Time Out: [facility]

## 2021-05-12 NOTE — PROGRESS NOTES
Problem: Pain  Goal: *Control of Pain  Outcome: Progressing Towards Goal     Problem: Patient Education: Go to Patient Education Activity  Goal: Patient/Family Education  Outcome: Progressing Towards Goal     Problem: Knee Replacement: Day of Surgery/Unit  Goal: Activity/Safety  Outcome: Progressing Towards Goal  Goal: Consults, if ordered  Outcome: Progressing Towards Goal  Goal: Diagnostic Test/Procedures  Outcome: Progressing Towards Goal  Goal: Nutrition/Diet  Outcome: Progressing Towards Goal  Goal: Medications  Outcome: Progressing Towards Goal  Goal: Respiratory  Outcome: Progressing Towards Goal  Goal: Treatments/Interventions/Procedures  Outcome: Progressing Towards Goal  Goal: Psychosocial  Outcome: Progressing Towards Goal  Goal: *Initiate mobility  Outcome: Progressing Towards Goal  Goal: *Optimal pain control at patient's stated goal  Outcome: Progressing Towards Goal  Goal: *Hemodynamically stable  Outcome: Progressing Towards Goal

## 2021-05-12 NOTE — PROGRESS NOTES
Orthopedic Progress Note    May 12, 2021  Admit Date: 2021  Admit Diagnosis: Arthritis of right knee [M17.11]    Post Op day: 1 Day Post-Op    Subjective:     Yadira Bravo     Appears to be doing okay. Some nausea currently. The patient was noted to have a piece of cake and a box of fruity bebeto on his bedside table. Objective:     Vital Signs:    Temp (24hrs), Av.1 °F (36.7 °C), Min:97.5 °F (36.4 °C), Max:98.6 °F (37 °C)      LAB:    [unfilled]  Lab Results   Component Value Date/Time    INR 1.0 2021 10:24 AM     Lab Results   Component Value Date/Time    HGB 12.3 (L) 2021 04:36 AM    HGB 13.0 (L) 2021 10:46 PM       Physical Exam:    No apparent distress   No neurovascular issues reported  No gross problems noted   Dressing clean dry and intact. Plan:     Continue PT/OT rehab as indicated    Nursing and SS for disposition plans    We will add Zofran to try to treat his nausea. If his nausea improves he should be able to discharge home. I spent a long time admonishing him for his complete reversal on promising me preoperatively extensively and yesterday immediately preoperatively that he was going to stop eating sugar and sweets. I stressed to him that failure to abide by this will predictably increase the chance of him having a suboptimal outcome with regards to his knee.        Signed By: Mirella Carroll MD

## 2021-05-12 NOTE — OP NOTES
[address] [place] [address]  OPERATIVE REPORT    Name:  Maryln Bence  MR#:  [id number]  :  1970  ACCOUNT #:  [de-identified]  DATE OF SERVICE:  2021    PREOPERATIVE DIAGNOSIS:  Right knee osteoarthritis. POSTOPERATIVE DIAGNOSIS:  Right knee osteoarthritis. PROCEDURE PERFORMED:  Right total knee arthroplasty. SURGEON:  Jayne Suarez MD    ASSISTANT:  None. ANESTHESIA:  Adductor canal block with spinal.    COMPLICATIONS:  None. SPECIMENS REMOVED:  Resected bone for routine disposal.    IMPLANTS:  [employer]hew size 6 Oxinium bi-cruciate substituting femur, size 6 Journey II distal femur, 32 mm patella polyethylene and 10-mm tibial polyethylene insert. ESTIMATED BLOOD LOSS:  About 150 mL. MEDICATIONS:  Naropin periarticular cocktail, tranexamic acid IV protocol and vancomycin intraarticular powder. PROCEDURE:  After discussion of risks, benefits and alternatives, the patient expressed understanding and strongly desired to proceed with surgical treatment. He was brought to the operating room after verification of the patient's site, side, diagnosis and procedure, spinal anesthesia was administered. He had already received adductor canal block. The right lower extremity was prepped and draped in normal sterile fashion. Time-out was then performed. Mid vastus approach was performed. Tricompartmental osteoarthritis was demonstrated which was most severe in the medial compartment with full-thickness articular cartilage loss. The remaining articular cartilage of the medial femoral condyle was crumbling and very friable. Intramedullary alignment was used to perform the distal femoral resection. Extramedullary alignment was used to perform the tibial resection. Extension gap balancing was performed. Flexion gap balancing was used to set the femoral rotation and size the distal femur. A size 6 femur was selected.   Anterior, posterior and chamfer cuts were performed for a journey to knee. The trial femur was placed and the box was prepared. The tibia was sized and found to be consistent with a size 6. Trialing demonstrated excellent stability, tracking, balance alignment and range of motion. Appropriate soft tissue releases and resections had already been performed. Patellar resection was performed as well and then the lateral patellar facet was beveled. Preparation was performed for a 32-mm patella. The cut bony surfaces were copiously irrigated. The tibial fin punch had already been performed. A vacuum-mixed cement was then used to coat the bone implant interface as a size 6 tibia, size 6 bi-cruciate substituting Oxinium femur, 10-mm permanent polyethylene and a 32-mm patella polyethylene were then placed. The knee was held in extension with a cement clamp as the cement hardened. At each stage of implantation prior to that, extruded cement was removed from the operative field. Subsequent examination demonstrated excellent stability, alignment, tracking, balance and range of motion. The wound was copiously irrigated at length including a dilute Betadine solution. A periarticular Naropin cocktail was injected. Layered wound closure was completed. The patient tolerated the procedure well. There were no complications and no specimens were sent for pathology. POSTOPERATIVE PLAN:  Standard postoperative antibiotic and DVT prophylaxis. Weightbearing as tolerated. Multimodal pain management. I have discussed with the patient preoperatively the importance of weight loss for his overall health as well as his knee recovery and longevity of his knee.       Adele Wang MD      [id number]  D:  05/11/2021 14:29  T:  05/12/2021 0:48  JOB #:  [id number]

## 2021-05-12 NOTE — DISCHARGE INSTRUCTIONS
Patient Education        Total Knee Replacement: What to Expect at the Hospital  What care can you expect in the hospital?     After knee replacement surgery, you will be taken to the recovery room. In a few hours, you will go to your hospital room. You may see a metal triangle called a trapeze over your bed. You can use this to help move yourself around in bed. You will be very tired and will want to rest. Your nurse may also help turn you as you rest.  You will be getting fluids into your vein through an IV tube. You may also have a tube called a drain near the cut (incision) in your knee. You may not feel hungry. You may feel sick to your stomach or constipated for a couple of days. This is common. Your nurse may give you stool softeners or laxatives to help with constipation. You may have stockings that put pressure on your legs to prevent blood clots. Your nurse will also give you medicine and exercise instructions to help prevent clots. Most people get out of bed with help on the day of surgery. Your doctor will let you know if you will stay in the hospital or if you can go home the day of surgery. This care sheet gives you a general idea about how your recovery will begin in the hospital. Each person has a different experience and recovers at a different pace. What will happen in the hospital?  Pain and pain medicine    · You will receive medicine to help control pain. Some pain medicines are given through an IV. Others are taken by mouth.     · Take it as needed, and remember that it is easier to prevent pain before it starts than to stop it after it has started.     · If you are still in pain after you take your medicine, tell your nurse. You may need new medicine or to get the medicine in a different form. Other medicines    · Your doctor will tell you if and when you can restart your medicines.  You will also get instructions about taking any new medicines.     · If you take aspirin or some other blood thinner, ask your doctor if and when to start taking it again. Make sure that you understand exactly what your doctor wants you to do.     · Your doctor will probably give you blood thinners to prevent blood clots in your leg. You take this medicine during your hospital stay and when you go home. Rehabilitation    · Your rehabilitation (rehab) will probably begin the day of your surgery. Your physical therapist will get you started. It may be painful to exercise at first, but your nurse will give you pain medicine if you need it.     · Your physical therapist will help you walk, go up and down stairs, and get in and out of bed and chairs. The therapist will help improve the movement (range of motion) and strength in your knee. You will learn positions and motions that will help prevent your knee from popping out (dislocating). This is a very important part of your therapy.     · How quickly you regain strength and motion and do things on your own depends on how well you follow your physical therapy. Your physical therapist will teach you the exercises, but you must do them yourself.     · An occupational therapist will work with you. You will learn how to bathe, dress, and do daily activities. You may need tools to help with everyday activities. Tools include shower stools, shoehorns, and long-handled sponges. Diet    · You will probably get liquids at first, but you can start to eat your normal diet when you feel like it. If your stomach is upset, your nurse will probably bring you bland, low-fat foods like plain rice, broiled chicken, toast, and yogurt.     · You may have more fiber added to your meals to prevent constipation. Incision care    · You will have a bandage over your incision. Your nurse will care for this. Other instructions    · Your nurse or respiratory therapist will have you do breathing and coughing exercises to prevent problems such as pneumonia.  Breathe in deeply through your nose, and slowly breathe out through your mouth. Do this 3 times, and then cough 2 times.     · You may have a device (incentive spirometer) that you suck air through to help keep your lungs healthy. Use this as your nurse or therapist tells you to. Follow-up care is a key part of your treatment and safety. Be sure to make and go to all appointments, and call your doctor if you are having problems. It's also a good idea to know your test results and keep a list of the medicines you take. When should you call for help? · You have severe trouble breathing.     · You have a cough, shortness of breath, or chest pain.     · You are sick to your stomach or cannot keep fluids down.     · You have signs of a blood clot, such as:  ? Pain in your calf, back of the knee, thigh, or groin. ? Redness and swelling in your leg or groin.     · You are in pain or your pain does not get better after you take pain medicine.     · Bright red blood has soaked through the bandage over your incision.     · You have signs of infection, such as:  ? Increased pain, swelling, warmth, or redness. ? Red streaks leading from the incision. ? Pus draining from the incision. ? A fever. Where can you learn more? Go to http://www.gray.com/  Enter G729 in the search box to learn more about \"Total Knee Replacement: What to Expect at the Hospital.\"  Current as of: November 16, 2020               Content Version: 12.8  © 2006-2021 [employer]. Care instructions adapted under license by [employer] (which disclaims liability or warranty for this information). If you have questions about a medical condition or this instruction, always ask your healthcare professional. [place] [address] any warranty or liability for your use of this information.          Patient Education        Knee Arthroscopy: What to Expect at Home  Your Recovery     Arthroscopy is a way to find problems and do surgery inside a joint without making a large cut (incision). Your doctor put a lighted tube with a tiny camera--called an arthroscope, or scope--and surgical tools through small incisions in your knee. You will feel tired for several days. Your knee will be swollen. And you may notice that your skin is a different color near the cuts (incisions). The swelling is normal and will start to go away in a few days. Keeping your leg higher than your heart will help with swelling and pain. You will probably need about 6 weeks to recover. If your doctor repaired damaged tissue, recovery will take longer. You may have to limit your activity until your knee strength and movement are back to normal. You may also be in a physical rehabilitation (rehab) program.  You may be able to return to a desk job or your normal routine in a few days. But if you do physical labor, it may be as long as 2 months before you can go back to work. This care sheet gives you a general idea about how long it will take for you to recover. But each person recovers at a different pace. Follow the steps below to get better as quickly as possible. How can you care for yourself at home? Activity    · Rest when you feel tired. Getting enough sleep will help you recover. Use pillows to raise your ankle and leg above the level of your heart.     · Try to walk each day, after your doctor has said you can. Start by walking a little more than you did the day before. Bit by bit, increase the amount you walk. Walking boosts blood flow and helps prevent pneumonia and constipation.     · You may have a brace or crutches or both.     · Your doctor will tell you how often and how much you can move your leg and knee.     · If you have a desk job, you may be able to return to work a few days after the surgery.  If you lift things or stand or walk a lot at work, it may be as long as 2 months before you can return.     · You can take a shower 48 to 72 hours after surgery and clean the incisions with regular soap and water. Do not take a bath or soak your knee until your doctor says it is okay.     · Ask your doctor when you can drive again.     · If you had a repair of torn tissue, follow your doctor's instructions for lifting things or moving your knee. Diet    · You can eat your normal diet. If your stomach is upset, try bland, low-fat foods like plain rice, broiled chicken, toast, and yogurt.     · Drink plenty of fluids, unless your doctor tells you not to.     · You may notice that your bowel movements are not regular right after your surgery. This is common. Try to avoid constipation and straining with bowel movements. You may want to take a fiber supplement every day. If you have not had a bowel movement after a couple of days, ask your doctor about taking a mild laxative. Medicines    · Your doctor will tell you if and when you can restart your medicines. He or she will also give you instructions about taking any new medicines.     · If you take aspirin or some other blood thinner, ask your doctor if and when to start taking it again. Make sure that you understand exactly what your doctor wants you to do.     · Be safe with medicines. Take pain medicines exactly as directed. ? If the doctor gave you a prescription medicine for pain, take it as prescribed. ? If you are not taking a prescription pain medicine, ask your doctor if you can take an over-the-counter medicine.     · If you think your pain medicine is making you sick to your stomach:  ? Take your medicine after meals (unless your doctor has told you not to). ? Ask your doctor for a different pain medicine.     · If your doctor prescribed antibiotics, take them as directed. Do not stop taking them just because you feel better. You need to take the full course of antibiotics. Incision care    · If you have a dressing over your cuts (incisions), keep it clean and dry.  You may remove it 48 to 72 hours after the surgery.     · If your incisions are open to the air, keep the area clean and dry.     · If you have strips of tape on the incisions, leave the tape on for a week or until it falls off. Exercise    · Move your toes and ankle as much as your bandages will allow.     · Bend and straighten your knee slowly several times during the day.     · Depending on why you had the surgery, you may have to do ankle and leg exercises. Your doctor or physical therapist will give you exercises as part of a rehabilitation program.     · Stop any activity that causes sharp pain. Talk to your doctor or physical therapist about what sports or other exercise you can do. Ice    · To reduce swelling and pain, put ice or a cold pack on your knee for 10 to 20 minutes at a time. Do this every 1 to 2 hours. Put a thin cloth between the ice and your skin. Follow-up care is a key part of your treatment and safety. Be sure to make and go to all appointments, and call your doctor if you are having problems. It's also a good idea to know your test results and keep a list of the medicines you take. When should you call for help? Call 911 anytime you think you may need emergency care. For example, call if:    · You passed out (lost consciousness).     · You have severe trouble breathing.     · You have sudden chest pain and shortness of breath, or you cough up blood. Call your doctor now or seek immediate medical care if:    · Your foot or toes are numb or tingling.     · Your foot is cool or pale, or it changes color.     · You have signs of a blood clot, such as:  ? Pain in your calf, back of the knee, thigh, or groin. ?  Redness and swelling in your leg or groin.     · You are sick to your stomach or cannot keep fluids down.     · You have pain that does not get better after you take pain medicine.     · You have loose stitches, or your incision comes open.     · Bright red blood has soaked through the bandage over your incision.     · You have signs of infection, such as:  ? Increased pain, swelling, warmth, or redness. ? Red streaks leading from the incisions. ? Pus draining from the incisions. ? A fever. Watch closely for any changes in your health, and be sure to contact your doctor if:    · You do not have a bowel movement after taking a laxative. Where can you learn more? Go to http://www.gray.com/  Enter U880 in the search box to learn more about \"Knee Arthroscopy: What to Expect at Home. \"  Current as of: November 16, 2020               Content Version: 12.8  © 2006-2021 [employer]. Care instructions adapted under license by [employer] (which disclaims liability or warranty for this information). If you have questions about a medical condition or this instruction, always ask your healthcare professional. [address] any warranty or liability for your use of this information.

## 2021-05-12 NOTE — PROGRESS NOTES
Problem: Pain  Goal: *Control of Pain  Outcome: Progressing Towards Goal  Goal: *PALLIATIVE CARE:  Alleviation of Pain  Outcome: Progressing Towards Goal     Problem: Patient Education: Go to Patient Education Activity  Goal: Patient/Family Education  Outcome: Progressing Towards Goal     Problem: Patient Education: Go to Patient Education Activity  Goal: Patient/Family Education  Outcome: Progressing Towards Goal     Problem: Knee Replacement: Day of Surgery/Unit  Goal: Off Pathway (Use only if patient is Off Pathway)  Outcome: Progressing Towards Goal  Goal: Activity/Safety  Outcome: Progressing Towards Goal  Goal: Consults, if ordered  Outcome: Progressing Towards Goal  Goal: Diagnostic Test/Procedures  Outcome: Progressing Towards Goal  Goal: Nutrition/Diet  Outcome: Progressing Towards Goal  Goal: Medications  Outcome: Progressing Towards Goal  Goal: Respiratory  Outcome: Progressing Towards Goal  Goal: Treatments/Interventions/Procedures  Outcome: Progressing Towards Goal  Goal: Psychosocial  Outcome: Progressing Towards Goal  Goal: *Initiate mobility  Outcome: Progressing Towards Goal  Goal: *Optimal pain control at patient's stated goal  Outcome: Progressing Towards Goal  Goal: *Hemodynamically stable  Outcome: Progressing Towards Goal     Problem: Knee Replacement: Post-Op Day 1  Goal: Off Pathway (Use only if patient is Off Pathway)  Outcome: Progressing Towards Goal  Goal: Activity/Safety  Outcome: Progressing Towards Goal  Goal: Diagnostic Test/Procedures  Outcome: Progressing Towards Goal  Goal: Nutrition/Diet  Outcome: Progressing Towards Goal  Goal: Medications  Outcome: Progressing Towards Goal  Goal: Respiratory  Outcome: Progressing Towards Goal  Goal: Treatments/Interventions/Procedures  Outcome: Progressing Towards Goal  Goal: Psychosocial  Outcome: Progressing Towards Goal  Goal: Discharge Planning  Outcome: Progressing Towards Goal  Goal: *Demonstrates progressive activity  Outcome: Progressing Towards Goal  Goal: *Optimal pain control at patient's stated goal  Outcome: Progressing Towards Goal  Goal: *Hemodynamically stable  Outcome: Progressing Towards Goal  Goal: *Discharge plan identified  Outcome: Progressing Towards Goal     Problem: Knee Replacement: Post-Op Day 2  Goal: Off Pathway (Use only if patient is Off Pathway)  Outcome: Progressing Towards Goal  Goal: Activity/Safety  Outcome: Progressing Towards Goal  Goal: Diagnostic Test/Procedures  Outcome: Progressing Towards Goal  Goal: Medications  Outcome: Progressing Towards Goal  Goal: Respiratory  Outcome: Progressing Towards Goal  Goal: Treatments/Interventions/Procedures  Outcome: Progressing Towards Goal  Goal: Psychosocial  Outcome: Progressing Towards Goal  Goal: *Met physical therapy criteria for discharge to the next level of care  Outcome: Progressing Towards Goal  Goal: *Optimal pain control with oral analgesia  Outcome: Progressing Towards Goal  Goal: *Hemodynamically stable  Outcome: Progressing Towards Goal  Goal: *Tolerating diet  Outcome: Progressing Towards Goal  Goal: *Patient verbalizes understanding of discharge instructions  Outcome: Progressing Towards Goal     Problem: Falls - Risk of  Goal: *Absence of Falls  Description: Document Arnel Fall Risk and appropriate interventions in the flowsheet.   Outcome: Progressing Towards Goal  Note: Fall Risk Interventions:  Mobility Interventions: Patient to call before getting OOB         Medication Interventions: Teach patient to arise slowly, Patient to call before getting OOB    Elimination Interventions: Call light in reach              Problem: Patient Education: Go to Patient Education Activity  Goal: Patient/Family Education  Outcome: Progressing Towards Goal

## 2021-05-12 NOTE — ROUTINE PROCESS
Given paperwork, removed IV, changed dressing, all questions answered at this time.
full range of motion in all extremities

## 2021-05-12 NOTE — PROGRESS NOTES
Problem: Pain  Goal: *Control of Pain  5/12/2021 1516 by Eitan Reed RN  Outcome: Resolved/Met  5/12/2021 1006 by Ashlee Greenberg RN  Outcome: Progressing Towards Goal  Goal: *PALLIATIVE CARE:  Alleviation of Pain  5/12/2021 1516 by Eitan Reed RN  Outcome: Resolved/Met  5/12/2021 1006 by Moni Rosado RN  Outcome: Progressing Towards Goal     Problem: Patient Education: Go to Patient Education Activity  Goal: Patient/Family Education  5/12/2021 1516 by Eitan Reed RN  Outcome: Resolved/Met  5/12/2021 1006 by Moni Rosado RN  Outcome: Progressing Towards Goal     Problem: Patient Education: Go to Patient Education Activity  Goal: Patient/Family Education  5/12/2021 1516 by Eitan Reed RN  Outcome: Resolved/Met  5/12/2021 1006 by Ashlee Greenberg RN  Outcome: Progressing Towards Goal     Problem: Knee Replacement: Day of Surgery/Unit  Goal: Off Pathway (Use only if patient is Off Pathway)  5/12/2021 1516 by Moni Rosado RN  Outcome: Resolved/Met  5/12/2021 1006 by Eitan Reed RN  Outcome: Progressing Towards Goal  Goal: Activity/Safety  5/12/2021 1516 by Eitan Reed RN  Outcome: Resolved/Met  5/12/2021 1006 by Moni Rosado RN  Outcome: Progressing Towards Goal  Goal: Consults, if ordered  5/12/2021 1516 by Moni Rosado RN  Outcome: Resolved/Met  5/12/2021 1006 by Eitan Reed RN  Outcome: Progressing Towards Goal  Goal: Diagnostic Test/Procedures  5/12/2021 1516 by Eitan Reed RN  Outcome: Resolved/Met  5/12/2021 1006 by Eitan Reed RN  Outcome: Progressing Towards Goal  Goal: Nutrition/Diet  5/12/2021 1516 by Eitan Reed RN  Outcome: Resolved/Met  5/12/2021 1006 by Eitan Reed RN  Outcome: Progressing Towards Goal  Goal: Medications  5/12/2021 1516 by Eitan Reed RN  Outcome: Resolved/Met  5/12/2021 1006 by Eitan Reed RN  Outcome: Progressing Towards Goal  Goal: Respiratory  5/12/2021 [id number] by Eitan Reed RN  Outcome: Resolved/Met  5/12/2021 1006 by Ashlee Greenberg, RN  Outcome: Progressing Towards Goal  Goal: Treatments/Interventions/Procedures  5/12/2021 1516 by Subha Puente RN  Outcome: Resolved/Met  5/12/2021 1006 by Subha Puente RN  Outcome: Progressing Towards Goal  Goal: Psychosocial  5/12/2021 1516 by Subha Puente RN  Outcome: Resolved/Met  5/12/2021 1006 by Raj Rosado RN  Outcome: Progressing Towards Goal  Goal: *Initiate mobility  5/12/2021 1516 by Subha Puente RN  Outcome: Resolved/Met  5/12/2021 1006 by Raj Rosado RN  Outcome: Progressing Towards Goal  Goal: *Optimal pain control at patient's stated goal  5/12/2021 1516 by Subha Puente RN  Outcome: Resolved/Met  5/12/2021 1006 by Raj Rosado RN  Outcome: Progressing Towards Goal  Goal: *Hemodynamically stable  5/12/2021 1516 by Subha Puente RN  Outcome: Resolved/Met  5/12/2021 1006 by Ashlee Lee RN  Outcome: Progressing Towards Goal     Problem: Knee Replacement: Post-Op Day 1  Goal: Off Pathway (Use only if patient is Off Pathway)  5/12/2021 1516 by Raj Rosado RN  Outcome: Resolved/Met  5/12/2021 1006 by Subha Puente RN  Outcome: Progressing Towards Goal  Goal: Activity/Safety  5/12/2021 1516 by Subha Puente RN  Outcome: Resolved/Met  5/12/2021 1006 by Subha Puente RN  Outcome: Progressing Towards Goal  Goal: Diagnostic Test/Procedures  5/12/2021 1516 by Subha Puente RN  Outcome: Resolved/Met  5/12/2021 1006 by Subha Puente RN  Outcome: Progressing Towards Goal  Goal: Nutrition/Diet  5/12/2021 1516 by Subha Puente RN  Outcome: Resolved/Met  5/12/2021 1006 by Subha Puente RN  Outcome: Progressing Towards Goal  Goal: Medications  5/12/2021 1516 by Subha Puente RN  Outcome: Resolved/Met  5/12/2021 1006 by Subha Puente RN  Outcome: Progressing Towards Goal  Goal: Respiratory  5/12/2021 1516 by Subha Puente RN  Outcome: Resolved/Met  5/12/2021 1006 by Subha Puente RN  Outcome: Progressing Towards Goal  Goal: Treatments/Interventions/Procedures  5/12/2021 1516 by Wynell Holter, Catie, RN  Outcome: Resolved/Met  5/12/2021 1006 by Kimberly Soto RN  Outcome: Progressing Towards Goal  Goal: Psychosocial  5/12/2021 1516 by Kimberly Soto RN  Outcome: Resolved/Met  5/12/2021 1006 by Kimberly Soto RN  Outcome: Progressing Towards Goal  Goal: Discharge Planning  5/12/2021 1516 by Kimberly Soto RN  Outcome: Resolved/Met  5/12/2021 1006 by Roxanne Rosado RN  Outcome: Progressing Towards Goal  Goal: *Demonstrates progressive activity  5/12/2021 1516 by Kimberly Soto RN  Outcome: Resolved/Met  5/12/2021 1006 by Roxanne Rosado RN  Outcome: Progressing Towards Goal  Goal: *Optimal pain control at patient's stated goal  5/12/2021 1516 by iKmberly Soto RN  Outcome: Resolved/Met  5/12/2021 1006 by Roxanne Rosado RN  Outcome: Progressing Towards Goal  Goal: *Hemodynamically stable  5/12/2021 1516 by Kimberly Soto RN  Outcome: Resolved/Met  5/12/2021 1006 by Kimberly Soto RN  Outcome: Progressing Towards Goal  Goal: *Discharge plan identified  5/12/2021 1516 by Kimberly Soto RN  Outcome: Resolved/Met  5/12/2021 1006 by Wynell Holter, Catie, RN  Outcome: Progressing Towards Goal     Problem: Knee Replacement: Post-Op Day 2  Goal: Off Pathway (Use only if patient is Off Pathway)  5/12/2021 1516 by Roxanne Rosado RN  Outcome: Resolved/Met  5/12/2021 1006 by Kimberly Soto RN  Outcome: Progressing Towards Goal  Goal: Activity/Safety  5/12/2021 1516 by Kimberly Soto RN  Outcome: Resolved/Met  5/12/2021 1006 by Kimberly Soto RN  Outcome: Progressing Towards Goal  Goal: Diagnostic Test/Procedures  5/12/2021 1516 by Kimberly Soto RN  Outcome: Resolved/Met  5/12/2021 1006 by Kimberly Soto RN  Outcome: Progressing Towards Goal  Goal: Medications  5/12/2021 1516 by Kimberly Soto RN  Outcome: Resolved/Met  5/12/2021 1006 by Kimberly Soto, RN  Outcome: Progressing Towards Goal  Goal: Respiratory  5/12/2021 1516 by Kimberly Soto RN  Outcome: Resolved/Met  5/12/2021 1006 by Roxanne Rosado RN  Outcome: Progressing Towards Goal  Goal: Treatments/Interventions/Procedures  5/12/2021 1516 by Maverick Ritter RN  Outcome: Resolved/Met  5/12/2021 1006 by Maverick Ritter RN  Outcome: Progressing Towards Goal  Goal: Psychosocial  5/12/2021 1516 by Maverick Ritter RN  Outcome: Resolved/Met  5/12/2021 1006 by Ashlee Davey RN  Outcome: Progressing Towards Goal  Goal: *Met physical therapy criteria for discharge to the next level of care  5/12/2021 1516 by Maverick Ritter RN  Outcome: Resolved/Met  5/12/2021 1006 by Ainsley Rosado RN  Outcome: Progressing Towards Goal  Goal: *Optimal pain control with oral analgesia  5/12/2021 1516 by Maverick Ritter RN  Outcome: Resolved/Met  5/12/2021 1006 by Ainsley Rosado RN  Outcome: Progressing Towards Goal  Goal: *Hemodynamically stable  5/12/2021 1516 by Maverick Ritter RN  Outcome: Resolved/Met  5/12/2021 1006 by Ainsley Rosado RN  Outcome: Progressing Towards Goal  Goal: *Tolerating diet  5/12/2021 1516 by Maverick Ritter RN  Outcome: Resolved/Met  5/12/2021 1006 by Ainsley Rosado RN  Outcome: Progressing Towards Goal  Goal: *Patient verbalizes understanding of discharge instructions  5/12/2021 1516 by Maverick Ritter RN  Outcome: Resolved/Met  5/12/2021 1006 by Ashlee Davey RN  Outcome: Progressing Towards Goal     Problem: Falls - Risk of  Goal: *Absence of Falls  Description: Document Connor Lugo Fall Risk and appropriate interventions in the flowsheet.   5/12/2021 1516 by Ainsley Rosado RN  Outcome: Resolved/Met  Note: Fall Risk Interventions:  Mobility Interventions: Patient to call before getting OOB         Medication Interventions: Teach patient to arise slowly, Patient to call before getting OOB    Elimination Interventions: Call light in reach           5/12/2021 1006 by Ashlee Davey RN  Outcome: Progressing Towards Goal  Note: Fall Risk Interventions:  Mobility Interventions: Patient to call before getting OOB         Medication Interventions: Teach patient to arise slowly, Patient to call before getting OOB    Elimination Interventions: Call light in reach              Problem: Patient Education: Go to Patient Education Activity  Goal: Patient/Family Education  5/12/2021 [phone] by Zuri Rosado, RN  Outcome: Resolved/Met  5/12/2021 1006 by Zuri Rosado, RN  Outcome: Progressing Towards Goal     Problem: Patient Education: Go to Patient Education Activity  Goal: Patient/Family Education  Outcome: Resolved/Met     Problem: Patient Education: Go to Patient Education Activity  Goal: Patient/Family Education  Outcome: Resolved/Met

## 2021-05-12 NOTE — PROGRESS NOTES
ACUTE OT GOALS:  (Developed with and agreed upon by patient and/or caregiver.)  1. Patient will complete lower body bathing and dressing with Mod I and adaptive equipment as needed. 2. Patient will complete toileting with Mod I and adaptive equipment as needed. 3. Patient will complete BUE exercises to increase strength in BUE for performance of ADLs and functional transfers. 4. Patient will complete functional transfers with Mod I and adaptive equipment as needed. 5. Patient will complete standing grooming ADL with Mod I and adaptive equipment as needed. 6. Patient will verbalize and demonstrate post-operative precautions with 100% accuracy during ADL performance. Timeframe: 7 visits     OCCUPATIONAL THERAPY ASSESSMENT: Initial Assessment, Daily Note and AM OT Treatment Day # 1   WBAT COURTNEY Hobbs is a 45 y.o. male   PRIMARY DIAGNOSIS: <principal problem not specified>  Arthritis of right knee [M17.11]  Procedure(s) (LRB):  RIGHT KNEE REPLACEMENT TOTAL  (Right)  1 Day Post-Op  Reason for Referral:  Post-operative mobilization  ICD-10: Treatment Diagnosis: Generalized Muscle Weakness (M62.81)  Difficulty in walking, Not elsewhere classified (R26.2)  OBSERVATION: Payor: /   ASSESSMENT:     REHAB RECOMMENDATIONS:   Recommendation to date pending progress:  Setting:   No further skilled therapy . Will defer to PT following d/c. Equipment:    3 in 1 Bedside Commode to be used as shower chair.  Rolling Walker     PRIOR LEVEL OF FUNCTION:  (Prior to Hospitalization)  INITIAL/CURRENT LEVEL OF FUNCTION:  (Based on today's evaluation)   Bathing:   Independent  Dressing:   Independent  Feeding/Grooming:   Independent  Toileting:   Independent  Functional Mobility:   Independent Bathing:   Minimal Assistance  Dressing:   Moderate Assistance for lower body dressing. Feeding/Grooming:   Contact Guard Assistance in standing.   Toileting:   Minimal Assistance  Functional Mobility:   Contact Guard Assistance with RW     ASSESSMENT:  Mr. Juanjose Johnston was admitted for OA of R knee and is now s/p R Total Knee Replacement and WBAT in [facility]. Pt presents with deficits in overall strength, balance, and activity tolerance for performance of ADLs and functional mobility. Pt requires Min A to transfer to edge of bed. Seated edge of bed, pt requires Max A to complete sock management and Min A to begin to don shorts. Pt requires CGA with use of RW to complete sit <> stand transfer. Upon standing, pt requires Min A to don shorts remainder of the way. Pt provided with minimal verbal, visual, tactile, and manual cueing to hold head upright and bring pelvis forward to improve standing posture and balance in preparation for functional mobility. Pt requires CGA with use of RW to complete functional mobility to increase activity tolerance for household distances. Pt would benefit from continued skilled OT to increase independence and safety for performance of ADLs and functional mobility. SUBJECTIVE:   Mr. Juanjose Johnston states, \"I was in for 12 years. \" When asked length of service in [employer]. SOCIAL HISTORY/LIVING ENVIRONMENT: Pt lives alone in hotel room on 4th floor with elevator to access. Independent for ADLs and functional mobility. Works and drives. OBJECTIVE:     PAIN: VITAL SIGNS: LINES/DRAINS:   Pre Treatment: Pain Screen  Pain Scale 1: Numeric (0 - 10)  Pain Intensity 1: 8  Pain Onset 1: post op   Pain Location 1: Knee  Pain Orientation 1: Right  Post Treatment: Unchanged   IV  O2 Device: Nasal cannula     GROSS EVALUATION:  BUE Within Functional Limits Abnormal/ Functional Abnormal/ Non-Functional (see comments) Not Tested Comments:   AROM [x] [] [] []    PROM [] [] [] [x]    Strength [] [x] [] [] BUE; RUE weaker than LUE.    Balance [] [x] [] []    Posture [] [x] [] []    Sensation [x] [] [] []    Coordination [x] [] [] []    Tone [x] [] [] []    Edema [x] [] [] []    Activity Tolerance [] [x] [] []     [] [] [] []      COGNITION/  PERCEPTION: Intact Impaired   (see comments) Comments:   Orientation [x] []    Vision [x] []    Hearing [x] []    Judgment/ Insight [x] []    Attention [x] []    Memory [x] []    Command Following [x] []    Emotional Regulation [x] []     [] []      ACTIVITIES OF DAILY LIVING: I Mod I S SBA CGA Min Mod Max Total NT Comments   BASIC ADLs:              Bathing/ Showering [] [] [] [] [] [] [] [] [] [x]    Toileting [] [] [] [] [] [] [] [] [] [x]    Dressing [] [] [] [] [] [x] [] [x] [] [] Min A short management; Max A sock management. Feeding [] [] [] [] [] [] [] [] [] [x]    Grooming [] [] [] [] [] [] [] [] [] [x]    Personal Device Care [] [] [] [] [] [] [] [] [] [x]    Functional Mobility [] [] [] [] [x] [] [] [] [] [] x RW   I=Independent, Mod I=Modified Independent, S=Supervision, SBA=Standby Assistance, CGA=Contact Guard Assistance,   Min=Minimal Assistance, Mod=Moderate Assistance, Max=Maximal Assistance, Total=Total Assistance, NT=Not Tested    MOBILITY: I Mod I S SBA CGA Min Mod Max Total  NT x2 Comments:   Supine to sit [] [] [] [] [] [x] [] [] [] [] []    Sit to supine [] [] [] [] [] [] [] [] [] [x] []    Sit to stand [] [] [] [] [x] [] [] [] [] [] []    Bed to chair [] [] [] [] [x] [] [] [] [] [] [] X RW   I=Independent, Mod I=Modified Independent, S=Supervision, SBA=Standby Assistance, CGA=Contact Guard Assistance,   Min=Minimal Assistance, Mod=Moderate Assistance, Max=Maximal Assistance, Total=Total Assistance, NT=Not [facility] 6 Clicks   Daily Activity Inpatient Short Form        How much help from another person does the patient currently need. .. Total A Lot A Little None   1. Putting on and taking off regular lower body clothing? [] 1   [x] 2   [] 3   [] 4   2. Bathing (including washing, rinsing, drying)? [] 1   [] 2   [x] 3   [] 4   3. Toileting, which includes using toilet, bedpan or urinal?   [] 1   [] 2   [x] 3   [] 4   4.   Putting on and taking off regular upper body clothing? [] 1   [] 2   [x] 3   [] 4   5. Taking care of personal grooming such as brushing teeth? [] 1   [] 2   [x] 3   [] 4   6. Eating meals? [] 1   [] 2   [] 3   [x] 4   © 2007, Trustees of [address] [place] [address] [address] [address], under license to [employer]. All rights reserved     Score:  Initial: 18, completed, 5/12/2021 Most Recent: X (Date: -- )   Interpretation of Tool:  Represents activities that are increasingly more difficult (i.e. Bed mobility, Transfers, Gait). PLAN:   FREQUENCY/DURATION: OT Plan of Care: 3 times/week for duration of hospital stay or until stated goals are met, whichever comes first.    PROBLEM LIST:   (Skilled intervention is medically necessary to address:)  1. Decreased ADL/Functional Activities  2. Decreased Activity Tolerance  3. Decreased Balance  4. Decreased Gait Ability  5. Decreased Strength  6. Decreased Transfer Abilities  7. Increased Pain   INTERVENTIONS PLANNED:   (Benefits and precautions of occupational therapy have been discussed with the patient.)  1. Self Care Training  2. Therapeutic Activity  3. Therapeutic Exercise/HEP  4. Neuromuscular Re-education  5. Education     TREATMENT:     EVALUATION: Low Complexity : (Untimed Charge)    TREATMENT:  Co-Treatment PT/OT necessary due to patient's decreased overall endurance/tolerance levels, as well as need for high level skilled assistance to complete functional transfers/mobility and functional tasks  Self Care (8 Minutes): Self care including Lower Body Dressing to increase independence and decrease level of assistance required. Neuromuscular Re-education (15 Minutes): Neuromuscular Re-education included Balance Training, Postural training, Standing balance training and coordination training for BLE to improve Balance, Coordination, Functional Mobility and Postural Control.     TREATMENT GRID:  N/A    AFTER TREATMENT POSITION/PRECAUTIONS:  Chair, Needs within reach, RN notified and pt significant other seated adjacent.     INTERDISCIPLINARY COLLABORATION:  RN/PCT, PT/PTA and OT/LIMON    TOTAL TREATMENT DURATION:  OT Patient Time In/Time Out  Time In: 0926  Time Out: 0958    DANNY Rangel/FABIENNE

## 2021-05-12 NOTE — PROGRESS NOTES
ACUTE PHYSICAL THERAPY GOALS:  (Developed with and agreed upon by patient and/or caregiver. )  LT. Pt will be able to perform bed mobility and transfers with modified independence in order to be able to safely function within their home within 7 treatment days. 2. Pt will be able to ambulate a minimum of 500 ft with modified independence and use of least restrictive device in order to return to home and community ambulation within 7 treatment days. 3. Pt will demonstrate good standing and normal sitting balance with modified independence in order to safely perform functional mobility and ADLS within 7 treatment days. 4. Pt knee flexion will measure 90 degrees within 7 treatment days. RLE WBAT  PHYSICAL THERAPY ASSESSMENT: Initial Assessment, Daily Note and AM PT Treatment Day # 1      Jayashree Najera is a 45 y.o. male   PRIMARY DIAGNOSIS: <principal problem not specified>  Arthritis of right knee [M17.11]  Procedure(s) (LRB):  RIGHT KNEE REPLACEMENT TOTAL  (Right)  1 Day Post-Op  Reason for Referral:  S/P R TKA  ICD-10: Treatment Diagnosis: Pain in Right Knee (M25.561)  Difficulty in walking, Not elsewhere classified (R26.2)  Other abnormalities of gait and mobility (R26.89)  OBSERVATION: Payor: /     ASSESSMENT:     REHAB RECOMMENDATIONS:   Recommendation to date pending progress:  Setting:   Outpatient Therapy  Equipment:    Rolling Walker     PRIOR LEVEL OF FUNCTION:  (Prior to Hospitalization) INITIAL/CURRENT LEVEL OF FUNCTION:  (Most Recently Demonstrated)   Bed Mobility:   Independent  Sit to Stand:   Independent  Transfers:   Independent  Gait/Mobility:   Independent Bed Mobility:   Moderate Assistance  Sit to Stand:   Minimal Assistance x 2  Transfers:   Contact Guard Assistance  Gait/Mobility:   Contact Guard Assistance     ASSESSMENT:  Mr. Kevin Huertas is a 45year old male admitted s/p R TKA. He demonstrates decreased strength due to post surgical state as well as ROM.  Pt instructed on quad sets and heel slides as well as AP to continue to perform while waiting for PT to return this afternoon. Pt required mod A to come to sit EOB and min A x2 to come to stand with RW. He is very motivated and able to ambulate 250' with CGA and use of the RW with VC for improved step length and weight shifting. Pt is currently limited by his pain and functioning below his baseline. He would continue to benefit from skilled PT to maximize his rehab potential.      SUBJECTIVE:   Mr. Sarah Sheffield states, \"You want me to walk? \"    SOCIAL HISTORY/LIVING ENVIRONMENT: Pt lives in a hotel on the 4th floor with an elevator to enter, lives alone, independent with all ADLs and transfers prior, no falls     OBJECTIVE:     PAIN: VITAL SIGNS: LINES/DRAINS:   Pre Treatment: Pain Screen  Pain Scale 1: Numeric (0 - 10)  Pain Intensity 1: 8  Pain Onset 1: post op  Pain Location 1: Knee  Pain Orientation 1: Right  Post Treatment: 9   IV  O2 Device: Nasal cannula     GROSS EVALUATION:   Within Functional Limits Abnormal/ Functional Abnormal/ Non-Functional (see comments) Not Tested Comments:   AROM [] [x] [] [] Decreased R knee due to post surgical state   PROM [] [] [] []    Strength [] [x] [] [] Decreased RLE due to post surgical state   Balance [] [x] [] []    Posture [] [x] [] []    Sensation [] [x] [] []    Coordination [] [] [] []    Tone [] [] [] []    Edema [] [] [] []    Activity Tolerance [] [x] [] []     [] [] [] []      COGNITION/  PERCEPTION: Intact Impaired   (see comments) Comments:   Orientation [x] []    Vision [x] []    Hearing [x] []    Command Following [x] []    Safety Awareness [x] []     [] []      MOBILITY: I Mod I S SBA CGA Min Mod Max Total  NT x2 Comments:   Bed Mobility    Rolling [] [] [] [] [] [] [] [] [] [x] []    Supine to Sit [] [] [] [] [] [] [x] [] [] [] []    Scooting [] [] [] [] [] [] [] [] [] [x] []    Sit to Supine [] [] [] [] [x] [] [] [] [] [] []    Transfers    Sit to Stand [] [] [] [] [] [x] [] [] [] [] [x]    Bed to Chair [] [] [] [] [x] [] [] [] [] [] []    Stand to Sit [] [] [] [] [x] [] [] [] [] [] []    I=Independent, Mod I=Modified Independent, S=Supervision, SBA=Standby Assistance, CGA=Contact Guard Assistance,   Min=Minimal Assistance, Mod=Moderate Assistance, Max=Maximal Assistance, Total=Total Assistance, NT=Not Tested  GAIT: I Mod I S SBA CGA Min Mod Max Total  NT x2 Comments:   Level of Assistance [] [] [] [] [x] [] [] [] [] [] []    Distance 250'    DME Rolling Walker    Gait Quality Decreased step length LLE and stance time RLE, antalgic gait    Weightbearing Status WBAT     I=Independent, Mod I=Modified Independent, S=Supervision, SBA=Standby Assistance, CGA=Contact Guard Assistance,   Min=Minimal Assistance, Mod=Moderate Assistance, Max=Maximal Assistance, Total=Total Assistance, NT=Not Tested    [address] [place] [address]-[facility]       How much difficulty does the patient currently have. .. Unable A Lot A Little None   1. Turning over in bed (including adjusting bedclothes, sheets and blankets)? [] 1   [] 2   [x] 3   [] 4   2. Sitting down on and standing up from a chair with arms ( e.g., wheelchair, bedside commode, etc.)   [] 1   [] 2   [x] 3   [] 4   3. Moving from lying on back to sitting on the side of the bed? [] 1   [x] 2   [] 3   [] 4   How much help from another person does the patient currently need. .. Total A Lot A Little None   4. Moving to and from a bed to a chair (including a wheelchair)? [] 1   [] 2   [x] 3   [] 4   5. Need to walk in hospital room? [] 1   [] 2   [x] 3   [] 4   6. Climbing 3-5 steps with a railing? [] 1   [x] 2   [] 3   [] 4   © 2007, Trustees of [address] [place] [address], under license to [employer]. All rights reserved     Score:  Initial: 16 Most Recent: X (Date: -- )    Interpretation of Tool:  Represents activities that are increasingly more difficult (i.e. Bed mobility, Transfers, Gait).     PLAN: FREQUENCY/DURATION: PT Plan of Care: BID for duration of hospital stay or until stated goals are met, whichever comes first.    PROBLEM LIST:   (Skilled intervention is medically necessary to address:)  1. Decreased ADL/Functional Activities  2. Decreased Activity Tolerance  3. Decreased AROM/PROM  4. Decreased Balance  5. Decreased Coordination  6. Decreased Gait Ability  7. Decreased Strength  8. Decreased Transfer Abilities  9. Increased Pain   INTERVENTIONS PLANNED:   (Benefits and precautions of physical therapy have been discussed with the patient.)  1. Therapeutic Activity  2. Therapeutic Exercise/HEP  3. Neuromuscular Re-education  4. Gait Training  5. Manual Therapy  6. Education     TREATMENT:     EVALUATION: Low Complexity : (Untimed Charge)    TREATMENT:   (     )  Therapeutic Activity (23 Minutes): Therapeutic activity included Supine to Sit, Transfer Training, Ambulation on level ground, Sitting balance  and Standing balance to improve functional Mobility, Strength, ROM and Activity tolerance.     TREATMENT GRID:  N/A    AFTER TREATMENT POSITION/PRECAUTIONS:  Chair, Needs within reach, RN notified and Visitors at bedside    INTERDISCIPLINARY COLLABORATION:  RN/PCT, PT/PTA and OT/LIMON    TOTAL TREATMENT DURATION:  PT Patient Time In/Time Out  Time In: 0926  Time Out: [address], [place]

## 2021-05-14 ENCOUNTER — HOME CARE VISIT (OUTPATIENT)
Dept: SCHEDULING | Facility: HOME HEALTH | Age: 51
End: 2021-05-14
Payer: COMMERCIAL

## 2021-05-14 VITALS — SYSTOLIC BLOOD PRESSURE: 149 MMHG | TEMPERATURE: 97.3 F | DIASTOLIC BLOOD PRESSURE: 96 MMHG | HEART RATE: 88 BPM

## 2021-05-14 PROCEDURE — G0151 HHCP-SERV OF PT,EA 15 MIN: HCPCS

## 2021-05-14 PROCEDURE — 400013 HH SOC

## 2021-06-08 ENCOUNTER — HOME CARE VISIT (OUTPATIENT)
Dept: HOME HEALTH SERVICES | Facility: HOME HEALTH | Age: 51
End: 2021-06-08
Payer: COMMERCIAL

## 2021-06-14 ENCOUNTER — APPOINTMENT (OUTPATIENT)
Dept: PHYSICAL THERAPY | Age: 51
End: 2021-06-14
Attending: ORTHOPAEDIC SURGERY

## 2021-06-24 ENCOUNTER — ANESTHESIA EVENT (OUTPATIENT)
Dept: SURGERY | Age: 51
End: 2021-06-24
Payer: COMMERCIAL

## 2021-06-24 NOTE — H&P
Pre Operative History and Physical Exam    Patient ID:  Sandra Mcdonald  [id number]  39 y.o.  1970    Today: June 24, 2021       Assessment:   1. 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 Proceed with scheduled Procedure(s) (LRB):  RIGHT TOTAL KNEE MANIPULATION UNDER ANESTHESIA (Right)            CC: Right knee pain and stiffness    HPI:   The patient has right knee arthrofibrosis after total knee arthroplasty. He has had suboptimal participation in physical therapy. He has also been suboptimally compliant with weight loss and diabetic management efforts perioperatively. The patient was evaluated and examined during a consultation prior to this office visit. There have been no changes to the patient's orthopedic condition since the initial consultation. The patient has failed previous conservative treatment for this condition. The patient will present the day of surgery for Procedure(s) (LRB):  RIGHT TOTAL KNEE MANIPULATION UNDER ANESTHESIA (Right). We have discussed risks benefits and alternatives of right knee manipulation under anesthesia including fracture. He expressed understanding and strongly desires to proceed. Past Medical/Surgical History:  Past Medical History:   Diagnosis Date    Arthritis     OA    Diabetes ([facility] [address]     insulin- fbs avg 100- A1C 6.0  (5/2021) hyposymptoms at 60    Hypertension     carvedilol, losart-hct    Obesity (BMI 30-39.9) 06/22/2021    BMI 37.07    Sleep apnea     wears CPAP     Past Surgical History:   Procedure Laterality Date    HX COLONOSCOPY      HX ENDOSCOPY      HX KNEE ARTHROSCOPY      HX KNEE REPLACEMENT  05/2021        Allergies:    Allergies   Allergen Reactions    Lisinopril Cough        Physical Exam:   General: NAD, Alert, Oriented, Appears their stated age     [de-identified]: NC/AT, PERRL    Skin: No rashes, lesions or wounds seen      Psych: normal affect      Heart: Regular Rate, Rhythm     Lungs: unlabored respirations, normal breath sounds     Abdomen: Soft and non-distended     Ortho: Decreased range of motion right knee. Right knee incision is well-healed. The knee is stable. Extensor mechanism is intact. Neuro: no focal defects, sensation is equal bilaterally     Lymph: no lymphadenopathy     Meds:   No current facility-administered medications for this encounter. Current Outpatient Medications   Medication Sig    ergocalciferol (ERGOCALCIFEROL) 1,250 mcg (50,000 unit) capsule Take 50,000 Units by mouth every seven (7) days.  aspirin delayed-release 81 mg tablet TAKE 1 TAB BY MOUTH EVERY TWELVE (12) HOURS EVERY TWELVE (12) HOURS. INDICATIONS: DVT PREVENTION    losartan 50 mg tab 100 mg, hydroCHLOROthiazide 25 mg tab 25 mg Take  by mouth daily.  dapagliflozin (Farxiga) 5 mg tab tablet Take 5 mg by mouth daily.  semaglutide (Ozempic) 1 mg/dose (2 mg/1.5 mL) sub-q pen 1 mg by SubCUTAneous route every seven (7) days.  carvediloL (COREG) 12.5 mg tablet Take 12.5 mg by mouth two (2) times daily (with meals).  insulin glargine (Lantus Solostar U-100 Insulin) 100 unit/mL (3 mL) inpn 15 Units by SubCUTAneous route nightly. Labs:  Admission on 05/11/2021, Discharged on 05/12/2021   Component Date Value Ref Range Status    Prothrombin time 05/11/2021 13.6  12.5 - 14.7 sec Final    INR 05/11/2021 1.0    Final    Comment: Suggested therapeutic INR range:  Venous thrombosis and embolus  2.0-3.0  Prosthetic heart valve         2.5-3.5  ** Note new reference range and method **      aPTT 05/11/2021 28.7  24.1 - 35.1 SEC Final    Comment: Heparin Therapeutic Range = 74 - 123 seconds  When clinically evaluating a  prolonged PTT, preanalytic variables  including possible fluctuations in  levels of heparin should be considered.       Hemoglobin A1c 05/11/2021 6.0  4.2 - 6.3 % Final    Est. average glucose 05/11/2021 126  mg/dL Final    Comment: (NOTE)  The eAG should be interpreted with patient characteristics in mind   since ethnicity, interindividual differences, red cell lifespan,   variation in rates of glycation, etc. may affect the validity of the   calculation.       Color 05/11/2021 YELLOW    Final    Appearance 05/11/2021 CLEAR    Final    Specific gravity 05/11/2021 1.016  1.001 - 1.023   Final    pH (UA) 05/11/2021 5.5  5.0 - 9.0   Final    Protein 05/11/2021 TRACE* NEG mg/dL Final    Glucose 05/11/2021 Negative  mg/dL Final    Ketone 05/11/2021 Negative  NEG mg/dL Final    Bilirubin 05/11/2021 Negative  NEG   Final    Blood 05/11/2021 Negative  NEG   Final    Urobilinogen 05/11/2021 0.2  0.2 - 1.0 EU/dL Final    Nitrites 05/11/2021 Negative  NEG   Final    Leukocyte Esterase 05/11/2021 Negative  NEG   Final    WBC 05/11/2021 0-3  0 /hpf Final    RBC 05/11/2021 0  0 /hpf Final    Epithelial cells 05/11/2021 0  0 /hpf Final    Bacteria 05/11/2021 0  0 /hpf Final    Casts 05/11/2021 0  0 /lpf Final    Ventricular Rate 05/11/2021 80  BPM Final    Atrial Rate 05/11/2021 80  BPM Final    P-R Interval 05/11/2021 166  ms Final    QRS Duration 05/11/2021 78  ms Final    Q-T Interval 05/11/2021 380  ms Final    QTC Calculation (Bezet) 05/11/2021 438  ms Final    Calculated P Axis 05/11/2021 34  degrees Final    Calculated R Axis 05/11/2021 16  degrees Final    Calculated T Axis 05/11/2021 65  degrees Final    Diagnosis 05/11/2021    Final                    Value:Normal sinus rhythm  Anterior infarct (cited on or before 07-MAR-2017)  Nonspecific ST abnormality  When compared with ECG of 08-JUN-2020 18:18,  Questionable change in QRS axis  Nonspecific T wave abnormality has replaced inverted T waves in Inferior   leads  Confirmed by Alex Menjivar MD (), KARLEY CASTRO ([id number]) on 5/11/2021 11:02:29 AM      Crossmatch Expiration 05/11/2021 05/14/2021,2359   Final    ABO/Rh(D) 05/11/2021 B POSITIVE   Final    Antibody screen 05/11/2021 NEG   Final    Glucose (POC) 05/11/2021 117* 65 - 100 mg/dL Final    Comment: 47 - 60 mg/dl Consistent with, but not fully diagnostic of hypoglycemia. 101 - 125 mg/dl Impaired fasting glucose/consistent with pre-diabetes mellitus  > 126 mg/dl Fasting glucose consistent with overt diabetes mellitus      Performed by 05/11/2021 Shemar   Final    Glucose (POC) 05/11/2021 112* 65 - 100 mg/dL Final    Comment: 47 - 60 mg/dl Consistent with, but not fully diagnostic of hypoglycemia. 101 - 125 mg/dl Impaired fasting glucose/consistent with pre-diabetes mellitus  > 126 mg/dl Fasting glucose consistent with overt diabetes mellitus      Performed by 05/11/2021 CaveCarlsonSummerRN   Final    WBC 05/11/2021 7.3  4.3 - 11.1 K/uL Final    RBC 05/11/2021 5.53  4.23 - 5.6 M/uL Final    HGB 05/11/2021 13.6  13.6 - 17.2 g/dL Final    HCT 05/11/2021 43.8  41.1 - 50.3 % Final    MCV 05/11/2021 79.2* 79.6 - 97.8 FL Final    MCH 05/11/2021 24.6* 26.1 - 32.9 PG Final    MCHC 05/11/2021 31.1* 31.4 - 35.0 g/dL Final    RDW 05/11/2021 14.6  11.9 - 14.6 % Final    PLATELET 50/93/3183 192  150 - 450 K/uL Final    MPV 05/11/2021 11.0  9.4 - 12.3 FL Final    ABSOLUTE NRBC 05/11/2021 0.00  0.0 - 0.2 K/uL Final    **Note: Absolute NRBC parameter is now reported with Hemogram**    DF 05/11/2021 AUTOMATED    Final    NEUTROPHILS 05/11/2021 80* 43 - 78 % Final    LYMPHOCYTES 05/11/2021 14  13 - 44 % Final    MONOCYTES 05/11/2021 4  4.0 - 12.0 % Final    EOSINOPHILS 05/11/2021 2  0.5 - 7.8 % Final    BASOPHILS 05/11/2021 0  0.0 - 2.0 % Final    IMMATURE GRANULOCYTES 05/11/2021 1  0.0 - 5.0 % Final    ABS. NEUTROPHILS 05/11/2021 5.8  1.7 - 8.2 K/UL Final    ABS. LYMPHOCYTES 05/11/2021 1.0  0.5 - 4.6 K/UL Final    ABS. MONOCYTES 05/11/2021 0.3  0.1 - 1.3 K/UL Final    ABS. EOSINOPHILS 05/11/2021 0.1  0.0 - 0.8 K/UL Final    ABS. BASOPHILS 05/11/2021 0.0  0.0 - 0.2 K/UL Final    ABS. IMM.  GRANS. 05/11/2021 0.1  0.0 - 0.5 K/UL Final    Sodium 05/11/2021 143  136 - 145 mmol/L Final    Potassium 05/11/2021 3.8  3.5 - 5.1 mmol/L Final    Chloride 05/11/2021 110* 98 - 107 mmol/L Final    CO2 05/11/2021 30  21 - 32 mmol/L Final    Anion gap 05/11/2021 3* 7 - 16 mmol/L Final    Glucose 05/11/2021 156* 65 - 100 mg/dL Final    Comment: 47 - 60 mg/dl Consistent with, but not fully diagnostic of hypoglycemia. 101 - 125 mg/dl Impaired fasting glucose/consistent with pre-diabetes mellitus  > 126 mg/dl Fasting glucose consistent with overt diabetes mellitus      BUN 05/11/2021 18  6 - 23 MG/DL Final    Creatinine 05/11/2021 1.46  0.8 - 1.5 MG/DL Final    GFR est AA 05/11/2021 >60  >60 ml/min/1.73m2 Final    GFR est non-AA 05/11/2021 54* >60 ml/min/1.73m2 Final    Comment: (NOTE)  Estimated GFR is calculated using the Modification of Diet in Renal   Disease (MDRD) Study equation, reported for both  Americans   (GFRAA) and non- Americans (GFRNA), and normalized to 1.73m2   body surface area. The physician must decide which value applies to   the patient. The MDRD study equation should only be used in   individuals age 25 or older. It has not been validated for the   following: pregnant women, patients with serious comorbid conditions,   or on certain medications, or persons with extremes of body size,   muscle mass, or nutritional status.  Calcium 05/11/2021 8.7  8.3 - 10.4 MG/DL Final    Special Requests: 05/11/2021 NO SPECIAL REQUESTS    Final    Culture result: 05/11/2021 SA target not detected. A MRSA NEGATIVE, SA NEGATIVE test result does not preclude MRSA or SA nasal colonization. Final    HGB 05/11/2021 13.0* 13.6 - 17.2 g/dL Final    Glucose (POC) 05/11/2021 135* 65 - 100 mg/dL Final    Comment: 47 - 60 mg/dl Consistent with, but not fully diagnostic of hypoglycemia.   101 - 125 mg/dl Impaired fasting glucose/consistent with pre-diabetes mellitus  > 126 mg/dl Fasting glucose consistent with overt diabetes mellitus      Performed by 05/11/2021 Endy   Final    HGB 05/12/2021 12.3* 13.6 - 17.2 g/dL Final    Glucose (POC) 05/12/2021 173* 65 - 100 mg/dL Final    Comment: 47 - 60 mg/dl Consistent with, but not fully diagnostic of hypoglycemia. 101 - 125 mg/dl Impaired fasting glucose/consistent with pre-diabetes mellitus  > 126 mg/dl Fasting glucose consistent with overt diabetes mellitus      Performed by 05/12/2021 Endy   Final    Glucose (POC) 05/12/2021 130* 65 - 100 mg/dL Final    Comment: 47 - 60 mg/dl Consistent with, but not fully diagnostic of hypoglycemia. 101 - 125 mg/dl Impaired fasting glucose/consistent with pre-diabetes mellitus  > 126 mg/dl Fasting glucose consistent with overt diabetes mellitus      Performed by 05/12/2021 [facility]   Final   Hospital Outpatient Visit on 05/04/2021   Component Date Value Ref Range Status    WBC 05/04/2021 6.9  4.3 - 11.1 K/uL Final    RBC 05/04/2021 5.76* 4.23 - 5.6 M/uL Final    HGB 05/04/2021 14.4  13.6 - 17.2 g/dL Final    HCT 05/04/2021 44.1  41.1 - 50.3 % Final    MCV 05/04/2021 76.6* 79.6 - 97.8 FL Final    MCH 05/04/2021 25.0* 26.1 - 32.9 PG Final    MCHC 05/04/2021 32.7  31.4 - 35.0 g/dL Final    RDW 05/04/2021 15.0* 11.9 - 14.6 % Final    PLATELET 65/65/0476 060  150 - 450 K/uL Final    MPV 05/04/2021 11.1  9.4 - 12.3 FL Final    ABSOLUTE NRBC 05/04/2021 0.00  0.0 - 0.2 K/uL Final    **Note: Absolute NRBC parameter is now reported with Hemogram**    DF 05/04/2021 AUTOMATED    Final    NEUTROPHILS 05/04/2021 60  43 - 78 % Final    LYMPHOCYTES 05/04/2021 24  13 - 44 % Final    MONOCYTES 05/04/2021 11  4.0 - 12.0 % Final    EOSINOPHILS 05/04/2021 3  0.5 - 7.8 % Final    BASOPHILS 05/04/2021 1  0.0 - 2.0 % Final    IMMATURE GRANULOCYTES 05/04/2021 1  0.0 - 5.0 % Final    ABS. NEUTROPHILS 05/04/2021 4.1  1.7 - 8.2 K/UL Final    ABS. LYMPHOCYTES 05/04/2021 1.7  0.5 - 4.6 K/UL Final    ABS.  MONOCYTES 05/04/2021 0.7  0.1 - 1.3 K/UL Final  ABS. EOSINOPHILS 05/04/2021 0.2  0.0 - 0.8 K/UL Final    ABS. BASOPHILS 05/04/2021 0.1  0.0 - 0.2 K/UL Final    ABS. IMM. GRANS. 05/04/2021 0.0  0.0 - 0.5 K/UL Final    Sodium 05/04/2021 140  136 - 145 mmol/L Final    Potassium 05/04/2021 3.8  3.5 - 5.1 mmol/L Final    Chloride 05/04/2021 106  98 - 107 mmol/L Final    CO2 05/04/2021 30  21 - 32 mmol/L Final    Anion gap 05/04/2021 4* 7 - 16 mmol/L Final    Glucose 05/04/2021 75  65 - 100 mg/dL Final    Comment: 47 - 60 mg/dl Consistent with, but not fully diagnostic of hypoglycemia. 101 - 125 mg/dl Impaired fasting glucose/consistent with pre-diabetes mellitus  > 126 mg/dl Fasting glucose consistent with overt diabetes mellitus      BUN 05/04/2021 14  6 - 23 MG/DL Final    Creatinine 05/04/2021 1.18  0.8 - 1.5 MG/DL Final    GFR est AA 05/04/2021 >60  >60 ml/min/1.73m2 Final    GFR est non-AA 05/04/2021 >60  >60 ml/min/1.73m2 Final    Comment: (NOTE)  Estimated GFR is calculated using the Modification of Diet in Renal   Disease (MDRD) Study equation, reported for both  Americans   (GFRAA) and non- Americans (GFRNA), and normalized to 1.73m2   body surface area. The physician must decide which value applies to   the patient. The MDRD study equation should only be used in   individuals age 25 or older. It has not been validated for the   following: pregnant women, patients with serious comorbid conditions,   or on certain medications, or persons with extremes of body size,   muscle mass, or nutritional status.  Calcium 05/04/2021 9.8  8.3 - 10.4 MG/DL Final    Special Requests: 05/04/2021 NO SPECIAL REQUESTS    Final    Culture result: 05/04/2021 SA target not detected. A MRSA NEGATIVE, SA NEGATIVE test result does not preclude MRSA or SA nasal colonization.     Final    Glucose (POC) 05/04/2021 77  65 - 100 mg/dL Final    Comment: 47 - 60 mg/dl Consistent with, but not fully diagnostic of hypoglycemia. 101 - 125 mg/dl Impaired fasting glucose/consistent with pre-diabetes mellitus  > 126 mg/dl Fasting glucose consistent with overt diabetes mellitus      Performed by 05/04/2021 Ludin Petersen   Final   Orders Only on 04/26/2021   Component Date Value Ref Range Status    SARS-CoV-2, LULY 05/04/2021 Not Detected  Not Detected Final    Comment: This nucleic acid amplification test was developed and its performance  characteristics determined by [facility]. Nucleic acid  amplification tests include RT-PCR and TMA. This test has not been  FDA cleared or approved. This test has been authorized by FDA under  an Emergency Use Authorization (EUA). This test is only authorized  for the duration of time the declaration that circumstances exist  justifying the authorization of the emergency use of in vitro  diagnostic tests for detection of SARS-CoV-2 virus and/or diagnosis  of COVID-19 infection under section 564(b)(1) of the Act, 21 U. S.C.  423YGO-4(C) (1), unless the authorization is terminated or revoked  sooner. When diagnostic testing is negative, the possibility of a false  negative result should be considered in the context of a patient's  recent exposures and the presence of clinical signs and symptoms  consistent with COVID-19. An individual without symptoms of COVID-19  and who is not shedding SARS-CoV-2 virus wo                           uld expect to have a  negative (not detected) result in this assay.      [employer] Inpatient 05/04/2021 Comment   Final    Received    SARS-CoV-2, LULY 2 DAY TAT 05/04/2021 Performed   Final                 Patient Active Problem List   Diagnosis Code    Arthritis of right knee M17.11         Signed By: Opal Clark MD  June 24, 2021

## 2021-06-25 ENCOUNTER — HOSPITAL ENCOUNTER (OUTPATIENT)
Age: 51
Setting detail: OUTPATIENT SURGERY
Discharge: HOME | End: 2021-06-25
Attending: ORTHOPAEDIC SURGERY | Admitting: ORTHOPAEDIC SURGERY
Payer: COMMERCIAL

## 2021-06-25 ENCOUNTER — ANESTHESIA (OUTPATIENT)
Dept: SURGERY | Age: 51
End: 2021-06-25
Payer: COMMERCIAL

## 2021-06-25 VITALS
HEIGHT: 69 IN | WEIGHT: 276 LBS | SYSTOLIC BLOOD PRESSURE: 161 MMHG | BODY MASS INDEX: 40.88 KG/M2 | TEMPERATURE: 98.2 F | RESPIRATION RATE: 18 BRPM | DIASTOLIC BLOOD PRESSURE: 94 MMHG | OXYGEN SATURATION: 98 % | HEART RATE: 84 BPM

## 2021-06-25 DIAGNOSIS — M25.561 POSTOPERATIVE PAIN OF RIGHT KNEE: Primary | ICD-10-CM

## 2021-06-25 DIAGNOSIS — G89.18 POSTOPERATIVE PAIN OF RIGHT KNEE: Primary | ICD-10-CM

## 2021-06-25 LAB
GLUCOSE BLD STRIP.AUTO-MCNC: 97 MG/DL (ref 65–100)
POTASSIUM BLD-SCNC: 3.9 MMOL/L (ref 3.5–5.1)
SERVICE CMNT-IMP: NORMAL

## 2021-06-25 PROCEDURE — 82962 GLUCOSE BLOOD TEST: CPT

## 2021-06-25 PROCEDURE — 74011250636 HC RX REV CODE- 250/636: Performed by: NURSE ANESTHETIST, CERTIFIED REGISTERED

## 2021-06-25 PROCEDURE — 74011000250 HC RX REV CODE- 250: Performed by: NURSE ANESTHETIST, CERTIFIED REGISTERED

## 2021-06-25 PROCEDURE — 76210000020 HC REC RM PH II FIRST 0.5 HR: Performed by: ORTHOPAEDIC SURGERY

## 2021-06-25 PROCEDURE — 76210000006 HC OR PH I REC 0.5 TO 1 HR: Performed by: ORTHOPAEDIC SURGERY

## 2021-06-25 PROCEDURE — 74011250636 HC RX REV CODE- 250/636: Performed by: ANESTHESIOLOGY

## 2021-06-25 PROCEDURE — 76060000031 HC ANESTHESIA FIRST 0.5 HR: Performed by: ORTHOPAEDIC SURGERY

## 2021-06-25 PROCEDURE — 84132 ASSAY OF SERUM POTASSIUM: CPT

## 2021-06-25 PROCEDURE — 74011250637 HC RX REV CODE- 250/637: Performed by: ANESTHESIOLOGY

## 2021-06-25 PROCEDURE — 76010000154 HC OR TIME FIRST 0.5 HR: Performed by: ORTHOPAEDIC SURGERY

## 2021-06-25 PROCEDURE — 27570 FIXATION OF KNEE JOINT: CPT | Performed by: ORTHOPAEDIC SURGERY

## 2021-06-25 RX ORDER — ACETAMINOPHEN 500 MG
1000 TABLET ORAL ONCE
Status: COMPLETED | OUTPATIENT
Start: 2021-06-25 | End: 2021-06-25

## 2021-06-25 RX ORDER — FENTANYL CITRATE 50 UG/ML
100 INJECTION, SOLUTION INTRAMUSCULAR; INTRAVENOUS ONCE
Status: DISCONTINUED | OUTPATIENT
Start: 2021-06-25 | End: 2021-06-25 | Stop reason: HOSPADM

## 2021-06-25 RX ORDER — OXYCODONE HYDROCHLORIDE 5 MG/1
5 TABLET ORAL
Status: DISCONTINUED | OUTPATIENT
Start: 2021-06-25 | End: 2021-06-25 | Stop reason: HOSPADM

## 2021-06-25 RX ORDER — DIPHENHYDRAMINE HYDROCHLORIDE 50 MG/ML
12.5 INJECTION, SOLUTION INTRAMUSCULAR; INTRAVENOUS
Status: DISCONTINUED | OUTPATIENT
Start: 2021-06-25 | End: 2021-06-25 | Stop reason: HOSPADM

## 2021-06-25 RX ORDER — SODIUM CHLORIDE, SODIUM LACTATE, POTASSIUM CHLORIDE, CALCIUM CHLORIDE 600; 310; 30; 20 MG/100ML; MG/100ML; MG/100ML; MG/100ML
75 INJECTION, SOLUTION INTRAVENOUS CONTINUOUS
Status: DISCONTINUED | OUTPATIENT
Start: 2021-06-25 | End: 2021-06-25 | Stop reason: HOSPADM

## 2021-06-25 RX ORDER — MIDAZOLAM HYDROCHLORIDE 1 MG/ML
2 INJECTION, SOLUTION INTRAMUSCULAR; INTRAVENOUS
Status: DISCONTINUED | OUTPATIENT
Start: 2021-06-25 | End: 2021-06-25 | Stop reason: HOSPADM

## 2021-06-25 RX ORDER — PROPOFOL 10 MG/ML
INJECTION, EMULSION INTRAVENOUS AS NEEDED
Status: DISCONTINUED | OUTPATIENT
Start: 2021-06-25 | End: 2021-06-25 | Stop reason: HOSPADM

## 2021-06-25 RX ORDER — OXYCODONE HYDROCHLORIDE 5 MG/1
5 TABLET ORAL
Qty: 40 TABLET | Refills: 0 | Status: SHIPPED | OUTPATIENT
Start: 2021-06-25 | End: 2021-07-02

## 2021-06-25 RX ORDER — LIDOCAINE HYDROCHLORIDE 20 MG/ML
INJECTION, SOLUTION EPIDURAL; INFILTRATION; INTRACAUDAL; PERINEURAL AS NEEDED
Status: DISCONTINUED | OUTPATIENT
Start: 2021-06-25 | End: 2021-06-25 | Stop reason: HOSPADM

## 2021-06-25 RX ORDER — LIDOCAINE HYDROCHLORIDE 10 MG/ML
0.1 INJECTION INFILTRATION; PERINEURAL AS NEEDED
Status: DISCONTINUED | OUTPATIENT
Start: 2021-06-25 | End: 2021-06-25 | Stop reason: HOSPADM

## 2021-06-25 RX ORDER — PROPOFOL 10 MG/ML
INJECTION, EMULSION INTRAVENOUS
Status: DISCONTINUED | OUTPATIENT
Start: 2021-06-25 | End: 2021-06-25 | Stop reason: HOSPADM

## 2021-06-25 RX ORDER — HALOPERIDOL 5 MG/ML
1 INJECTION INTRAMUSCULAR
Status: DISCONTINUED | OUTPATIENT
Start: 2021-06-25 | End: 2021-06-25 | Stop reason: HOSPADM

## 2021-06-25 RX ORDER — SODIUM CHLORIDE, SODIUM LACTATE, POTASSIUM CHLORIDE, CALCIUM CHLORIDE 600; 310; 30; 20 MG/100ML; MG/100ML; MG/100ML; MG/100ML
100 INJECTION, SOLUTION INTRAVENOUS CONTINUOUS
Status: DISCONTINUED | OUTPATIENT
Start: 2021-06-25 | End: 2021-06-25 | Stop reason: HOSPADM

## 2021-06-25 RX ORDER — FLUMAZENIL 0.1 MG/ML
0.2 INJECTION INTRAVENOUS
Status: DISCONTINUED | OUTPATIENT
Start: 2021-06-25 | End: 2021-06-25 | Stop reason: HOSPADM

## 2021-06-25 RX ORDER — HYDROMORPHONE HYDROCHLORIDE 2 MG/ML
0.5 INJECTION, SOLUTION INTRAMUSCULAR; INTRAVENOUS; SUBCUTANEOUS
Status: COMPLETED | OUTPATIENT
Start: 2021-06-25 | End: 2021-06-25

## 2021-06-25 RX ORDER — NALOXONE HYDROCHLORIDE 0.4 MG/ML
0.1 INJECTION, SOLUTION INTRAMUSCULAR; INTRAVENOUS; SUBCUTANEOUS
Status: DISCONTINUED | OUTPATIENT
Start: 2021-06-25 | End: 2021-06-25 | Stop reason: HOSPADM

## 2021-06-25 RX ADMIN — SODIUM CHLORIDE, SODIUM LACTATE, POTASSIUM CHLORIDE, AND CALCIUM CHLORIDE: 600; 310; 30; 20 INJECTION, SOLUTION INTRAVENOUS at 12:58

## 2021-06-25 RX ADMIN — LIDOCAINE HYDROCHLORIDE 100 MG: 20 INJECTION, SOLUTION EPIDURAL; INFILTRATION; INTRACAUDAL; PERINEURAL at 13:02

## 2021-06-25 RX ADMIN — PROPOFOL 200 MCG/KG/MIN: 10 INJECTION, EMULSION INTRAVENOUS at 13:04

## 2021-06-25 RX ADMIN — OXYCODONE HYDROCHLORIDE 5 MG: 5 TABLET ORAL at 13:35

## 2021-06-25 RX ADMIN — HYDROMORPHONE HYDROCHLORIDE 0.5 MG: 2 INJECTION, SOLUTION INTRAMUSCULAR; INTRAVENOUS; SUBCUTANEOUS at 13:15

## 2021-06-25 RX ADMIN — PROPOFOL 30 MG: 10 INJECTION, EMULSION INTRAVENOUS at 13:06

## 2021-06-25 RX ADMIN — HYDROMORPHONE HYDROCHLORIDE 0.5 MG: 2 INJECTION, SOLUTION INTRAMUSCULAR; INTRAVENOUS; SUBCUTANEOUS at 13:25

## 2021-06-25 RX ADMIN — ACETAMINOPHEN 1000 MG: 500 TABLET ORAL at 11:14

## 2021-06-25 RX ADMIN — PROPOFOL 50 MG: 10 INJECTION, EMULSION INTRAVENOUS at 13:04

## 2021-06-25 RX ADMIN — HYDROMORPHONE HYDROCHLORIDE 0.5 MG: 2 INJECTION, SOLUTION INTRAMUSCULAR; INTRAVENOUS; SUBCUTANEOUS at 13:30

## 2021-06-25 RX ADMIN — HYDROMORPHONE HYDROCHLORIDE 0.5 MG: 2 INJECTION, SOLUTION INTRAMUSCULAR; INTRAVENOUS; SUBCUTANEOUS at 13:20

## 2021-06-25 NOTE — ANESTHESIA POSTPROCEDURE EVALUATION
Procedure(s):  RIGHT TOTAL KNEE MANIPULATION UNDER ANESTHESIA.    total IV anesthesia    Anesthesia Post Evaluation      Multimodal analgesia: multimodal analgesia used between 6 hours prior to anesthesia start to PACU discharge  Patient location during evaluation: bedside  Patient participation: complete - patient participated  Level of consciousness: responsive to verbal stimuli  Pain management: adequate  Airway patency: patent  Anesthetic complications: no  Cardiovascular status: hemodynamically stable  Respiratory status: spontaneous ventilation  Hydration status: stable    Final Post Anesthesia Temperature Assessment:  Normothermia (36.0-37.5 degrees C)      INITIAL Post-op Vital signs:   Vitals Value Taken Time   /106 06/25/21 1330   Temp 36.7 °C (98 °F) 06/25/21 1317   Pulse 82 06/25/21 1330   Resp 12 06/25/21 1317   SpO2 100 % 06/25/21 1330   Vitals shown include unvalidated device data.

## 2021-06-25 NOTE — PROGRESS NOTES
Update- No interval change. Site marked.  To OR today for Procedure(s) (LRB):  RIGHT TOTAL KNEE MANIPULATION UNDER ANESTHESIA (Right)

## 2021-06-25 NOTE — OP NOTES
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 PROCEDURE PERFORMED:  Right knee manipulation under anesthesia. SURGEON:  Hammad West MD    ASSISTANT:  None    ANESTHESIA:  IV sedation. COMPLICATIONS:  None. SPECIMENS REMOVED:  None. IMPLANTS:  None. ESTIMATED BLOOD LOSS:  None. DETAILS:  After discussion of risks, benefits and alternatives, the patient expressed understanding and strongly desired to proceed. He is also a slow starter in terms of his recovery after total knee arthroplasty. He has had significant interruption in his postoperative physical therapy due to some social factors. We discussed the possibility of a manipulation and he expressed understanding and strongly desired to proceed. He was brought to the operating room. After verification of the patient's site, side, diagnosis and procedure, IV sedation was administered. Time-out was performed. Gentle short lever arm techniques were then used to manipulate the right knee. Initially, he had range of motion from about 10 degrees to about 50 degrees. After the manipulation, he had full extension and flexion to 125 degrees. There was a gentle gradual release of soft tissue adhesions during the manipulation to accomplish the increased motion. The patient tolerated the procedure well. There were no complications. POSTOPERATIVE PLAN:  Weight bear as tolerated. Resume aggressive range of motion. Follow up with me in 2 weeks.       Libia Oliva MD      [id number]  D:  2021 13:16  T:  2021 14:01  JOB #:  [id number]

## 2021-06-25 NOTE — ANESTHESIA PREPROCEDURE EVALUATION
Relevant Problems   ENDOCRINE   (+) Arthritis of right knee       Anesthetic History   No history of anesthetic complications            Review of Systems / Medical History  Patient summary reviewed and pertinent labs reviewed    Pulmonary        Sleep apnea: No treatment           Neuro/Psych   Within defined limits           Cardiovascular    Hypertension: well controlled              Exercise tolerance: >4 METS  Comments: Stress Test 7/2020 showing normal LVEF and no inducible ischemia   GI/Hepatic/Renal  Within defined limits              Endo/Other    Diabetes (HgB A1c of 6.2): well controlled    Morbid obesity and arthritis     Other Findings              Physical Exam    Airway  Mallampati: III  TM Distance: > 6 cm  Neck ROM: normal range of motion, short neck   Mouth opening: Normal     Cardiovascular  Regular rate and rhythm,  S1 and S2 normal,  no murmur, click, rub, or gallop             Dental  No notable dental hx       Pulmonary  Breath sounds clear to auscultation               Abdominal  GI exam deferred       Other Findings            Anesthetic Plan    ASA: 3  Anesthesia type: total IV anesthesia            Anesthetic plan and risks discussed with: Patient

## 2021-06-25 NOTE — DISCHARGE INSTRUCTIONS
ACTIVITY  · As tolerated and as directed by your doctor. · You may shower in 24 hours. Do not take a bath until cleared by MD.     DIET  · Clear liquids until no nausea or vomiting, then light diet for the first day. · Advance to regular diet on second day, unless your doctor orders otherwise. · If nausea and vomiting continues, call your doctor. PAIN  · Take pain medication as directed by your doctor. · Call your doctor if pain is NOT relieved by medication. CALL YOUR DOCTOR IF   · Excessive bleeding that does not stop after holding pressure over the area. · Temperature of 101 degrees F or above. · Excessive redness, swelling or bruising, and/or green or yellow, smelly discharge from incision. After general anesthesia or intravenous sedation, for 24 hours or while taking prescription Narcotics:  · Limit your activities  · A responsible adult needs to be with you for the next 24 hours  · Do not drive and operate hazardous machinery  · Do not make important personal or business decisions  · Do not drink alcoholic beverages  · If you have not urinated within 8 hours after discharge, and you are experiencing discomfort from urinary retention, please go to the nearest ED. · If you have sleep apnea and have a CPAP machine, please use it for all naps and sleeping. · Please use caution when taking narcotics and any of your home medications that may cause drowsiness. *  Please give a list of your current medications to your Primary Care Provider. *  Please update this list whenever your medications are discontinued, doses are      changed, or new medications (including over-the-counter products) are added. *  Please carry medication information at all times in case of emergency situations.     These are general instructions for a healthy lifestyle:  No smoking/ No tobacco products/ Avoid exposure to second hand smoke  Surgeon General's Warning:  Quitting smoking now greatly reduces serious risk to your health. Obesity, smoking, and sedentary lifestyle greatly increases your risk for illness  A healthy diet, regular physical exercise & weight monitoring are important for maintaining a healthy lifestyle    You may be retaining fluid if you have a history of heart failure or if you experience any of the following symptoms:  Weight gain of 3 pounds or more overnight or 5 pounds in a week, increased swelling in our hands or feet or shortness of breath while lying flat in bed. Please call your doctor as soon as you notice any of these symptoms; do not wait until your next office visit.

## 2021-06-25 NOTE — PERIOP NOTES
Discharge instructions given to patient's friend, Spsonia Huff, at bedside. Verbalized understanding. No questions at this time.

## 2022-03-17 PROBLEM — E86.0 DEHYDRATION: Status: ACTIVE | Noted: 2020-07-28

## 2022-03-17 PROBLEM — E78.1 HYPERTRIGLYCERIDEMIA: Status: ACTIVE | Noted: 2020-09-11

## 2022-03-17 PROBLEM — R10.30 LOWER ABDOMINAL PAIN: Status: ACTIVE | Noted: 2020-08-20

## 2022-03-17 PROBLEM — G89.29 CHRONIC PAIN OF LEFT KNEE: Status: ACTIVE | Noted: 2019-12-09

## 2022-03-17 PROBLEM — E11.01 HYPEROSMOLAR HYPERGLYCEMIC COMA DUE TO DIABETES MELLITUS WITHOUT KETOACIDOSIS (HCC): Status: ACTIVE | Noted: 2020-07-28

## 2022-03-17 PROBLEM — I87.2 VENOUS INSUFFICIENCY: Status: ACTIVE | Noted: 2022-03-17

## 2022-03-17 PROBLEM — M17.11 PRIMARY OSTEOARTHRITIS OF RIGHT KNEE: Status: ACTIVE | Noted: 2021-04-28

## 2022-03-17 PROBLEM — R19.7 DIARRHEA IN ADULT PATIENT: Status: ACTIVE | Noted: 2020-08-20

## 2022-03-17 PROBLEM — K57.92 ACUTE DIVERTICULITIS: Status: ACTIVE | Noted: 2020-07-02

## 2022-03-17 PROBLEM — R07.9 CHEST PAIN: Status: ACTIVE | Noted: 2020-07-02

## 2022-03-17 PROBLEM — M25.562 CHRONIC PAIN OF LEFT KNEE: Status: ACTIVE | Noted: 2019-12-09

## 2022-03-17 PROBLEM — R63.4 WEIGHT LOSS: Status: ACTIVE | Noted: 2020-08-20

## 2022-03-17 PROBLEM — K62.5 RECTAL BLEEDING: Status: ACTIVE | Noted: 2020-07-02

## 2022-03-17 PROBLEM — D50.9 MICROCYTIC ANEMIA: Status: ACTIVE | Noted: 2020-08-20

## 2022-03-17 PROBLEM — I10 ESSENTIAL HYPERTENSION: Status: ACTIVE | Noted: 2019-12-04

## 2022-03-17 PROBLEM — R55 SYNCOPE: Status: ACTIVE | Noted: 2019-12-11

## 2022-03-17 PROBLEM — N17.9 ACUTE KIDNEY INJURY (HCC): Status: ACTIVE | Noted: 2020-07-28

## 2022-03-17 PROBLEM — A04.8 HELICOBACTER PYLORI INFECTION: Status: ACTIVE | Noted: 2020-10-26

## 2022-03-18 PROBLEM — M17.11 ARTHRITIS OF RIGHT KNEE: Status: ACTIVE | Noted: 2021-05-11

## 2022-03-24 PROBLEM — M17.11 PRIMARY OSTEOARTHRITIS OF RIGHT KNEE: Status: ACTIVE | Noted: 2021-04-28

## 2022-03-24 PROBLEM — K62.5 RECTAL BLEEDING: Status: ACTIVE | Noted: 2020-07-02

## 2022-03-24 PROBLEM — N17.9 ACUTE KIDNEY INJURY (HCC): Status: ACTIVE | Noted: 2020-07-28

## 2022-03-24 PROBLEM — E86.0 DEHYDRATION: Status: ACTIVE | Noted: 2020-07-28

## 2022-03-24 PROBLEM — E78.1 HYPERTRIGLYCERIDEMIA: Status: ACTIVE | Noted: 2020-09-11

## 2022-03-24 PROBLEM — K57.92 ACUTE DIVERTICULITIS: Status: ACTIVE | Noted: 2020-07-02

## 2022-03-24 PROBLEM — I10 ESSENTIAL HYPERTENSION: Status: ACTIVE | Noted: 2019-12-04

## 2022-03-24 PROBLEM — R63.4 WEIGHT LOSS: Status: ACTIVE | Noted: 2020-08-20

## 2022-03-24 PROBLEM — E11.01 HYPEROSMOLAR HYPERGLYCEMIC COMA DUE TO DIABETES MELLITUS WITHOUT KETOACIDOSIS (HCC): Status: ACTIVE | Noted: 2020-07-28

## 2022-03-24 PROBLEM — I87.2 VENOUS INSUFFICIENCY: Status: ACTIVE | Noted: 2022-03-17

## 2022-03-24 PROBLEM — R19.7 DIARRHEA IN ADULT PATIENT: Status: ACTIVE | Noted: 2020-08-20

## 2022-03-24 PROBLEM — R10.30 LOWER ABDOMINAL PAIN: Status: ACTIVE | Noted: 2020-08-20

## 2022-03-24 PROBLEM — D50.9 MICROCYTIC ANEMIA: Status: ACTIVE | Noted: 2020-08-20

## 2022-03-24 PROBLEM — G89.29 CHRONIC PAIN OF LEFT KNEE: Status: ACTIVE | Noted: 2019-12-09

## 2022-03-24 PROBLEM — R07.9 CHEST PAIN: Status: ACTIVE | Noted: 2020-07-02

## 2022-03-24 PROBLEM — R55 SYNCOPE: Status: ACTIVE | Noted: 2019-12-11

## 2022-03-24 PROBLEM — M25.562 CHRONIC PAIN OF LEFT KNEE: Status: ACTIVE | Noted: 2019-12-09

## 2022-03-24 PROBLEM — A04.8 HELICOBACTER PYLORI INFECTION: Status: ACTIVE | Noted: 2020-10-26

## 2022-04-16 PROBLEM — E86.0 DEHYDRATION: Status: RESOLVED | Noted: 2020-07-28 | Resolved: 2022-04-16

## 2022-05-02 ENCOUNTER — HOSPITAL ENCOUNTER (OUTPATIENT)
Dept: REHABILITATION | Age: 52
Discharge: HOME | End: 2022-05-02
Payer: COMMERCIAL

## 2022-05-02 ENCOUNTER — HOME HEALTH ADMISSION (OUTPATIENT)
Dept: HOME HEALTH SERVICES | Facility: HOME HEALTH | Age: 52
End: 2022-05-02

## 2022-05-02 ENCOUNTER — HOSPITAL ENCOUNTER (OUTPATIENT)
Dept: SURGERY | Age: 52
Discharge: HOME | End: 2022-05-02
Payer: COMMERCIAL

## 2022-05-02 VITALS
DIASTOLIC BLOOD PRESSURE: 118 MMHG | HEART RATE: 82 BPM | SYSTOLIC BLOOD PRESSURE: 158 MMHG | RESPIRATION RATE: 18 BRPM | WEIGHT: 292.7 LBS | TEMPERATURE: 97 F | OXYGEN SATURATION: 97 % | HEIGHT: 70 IN | BODY MASS INDEX: 41.9 KG/M2

## 2022-05-02 DIAGNOSIS — R06.83 SNORING: Primary | ICD-10-CM

## 2022-05-02 DIAGNOSIS — Z96.651 STATUS POST RIGHT KNEE REPLACEMENT: ICD-10-CM

## 2022-05-02 LAB
ANION GAP SERPL CALC-SCNC: 2 MMOL/L (ref 7–16)
APTT PPP: 29.8 SEC (ref 24.1–35.1)
ATRIAL RATE: 86 BPM
BACTERIA SPEC CULT: NORMAL
BASOPHILS # BLD: 0 K/UL (ref 0–0.2)
BASOPHILS NFR BLD: 1 % (ref 0–2)
BUN SERPL-MCNC: 15 MG/DL (ref 6–23)
CALCIUM SERPL-MCNC: 9.3 MG/DL (ref 8.3–10.4)
CALCULATED P AXIS, ECG09: 33 DEGREES
CALCULATED R AXIS, ECG10: 47 DEGREES
CALCULATED T AXIS, ECG11: -142 DEGREES
CHLORIDE SERPL-SCNC: 107 MMOL/L (ref 98–107)
CO2 SERPL-SCNC: 30 MMOL/L (ref 21–32)
CREAT SERPL-MCNC: 1.17 MG/DL (ref 0.8–1.5)
DIAGNOSIS, 93000: NORMAL
DIFFERENTIAL METHOD BLD: ABNORMAL
EOSINOPHIL # BLD: 0.2 K/UL (ref 0–0.8)
EOSINOPHIL NFR BLD: 4 % (ref 0.5–7.8)
ERYTHROCYTE [DISTWIDTH] IN BLOOD BY AUTOMATED COUNT: 14.7 % (ref 11.9–14.6)
EST. AVERAGE GLUCOSE BLD GHB EST-MCNC: 128 MG/DL
GLUCOSE SERPL-MCNC: 117 MG/DL (ref 65–100)
HBA1C MFR BLD: 6.1 % (ref 4.2–6.3)
HCT VFR BLD AUTO: 42.5 % (ref 41.1–50.3)
HGB BLD-MCNC: 14.2 G/DL (ref 13.6–17.2)
IMM GRANULOCYTES # BLD AUTO: 0 K/UL (ref 0–0.5)
IMM GRANULOCYTES NFR BLD AUTO: 0 % (ref 0–5)
INR PPP: 1
LYMPHOCYTES # BLD: 1.3 K/UL (ref 0.5–4.6)
LYMPHOCYTES NFR BLD: 25 % (ref 13–44)
MCH RBC QN AUTO: 24.8 PG (ref 26.1–32.9)
MCHC RBC AUTO-ENTMCNC: 33.4 G/DL (ref 31.4–35)
MCV RBC AUTO: 74.3 FL (ref 79.6–97.8)
MONOCYTES # BLD: 0.5 K/UL (ref 0.1–1.3)
MONOCYTES NFR BLD: 10 % (ref 4–12)
NEUTS SEG # BLD: 3 K/UL (ref 1.7–8.2)
NEUTS SEG NFR BLD: 60 % (ref 43–78)
NRBC # BLD: 0 K/UL (ref 0–0.2)
P-R INTERVAL, ECG05: 156 MS
PLATELET # BLD AUTO: 192 K/UL (ref 150–450)
PMV BLD AUTO: 11.7 FL (ref 9.4–12.3)
POTASSIUM SERPL-SCNC: 3.7 MMOL/L (ref 3.5–5.1)
PROTHROMBIN TIME: 13.4 SEC (ref 12.6–14.5)
Q-T INTERVAL, ECG07: 394 MS
QRS DURATION, ECG06: 76 MS
QTC CALCULATION (BEZET), ECG08: 471 MS
RBC # BLD AUTO: 5.72 M/UL (ref 4.23–5.6)
SERVICE CMNT-IMP: NORMAL
SODIUM SERPL-SCNC: 139 MMOL/L (ref 136–145)
VENTRICULAR RATE, ECG03: 86 BPM
WBC # BLD AUTO: 5.1 K/UL (ref 4.3–11.1)

## 2022-05-02 PROCEDURE — 77030027138 HC INCENT SPIROMETER -A

## 2022-05-02 PROCEDURE — 83036 HEMOGLOBIN GLYCOSYLATED A1C: CPT

## 2022-05-02 PROCEDURE — 94760 N-INVAS EAR/PLS OXIMETRY 1: CPT

## 2022-05-02 PROCEDURE — 85730 THROMBOPLASTIN TIME PARTIAL: CPT

## 2022-05-02 PROCEDURE — 87641 MR-STAPH DNA AMP PROBE: CPT

## 2022-05-02 PROCEDURE — 85610 PROTHROMBIN TIME: CPT

## 2022-05-02 PROCEDURE — 97161 PT EVAL LOW COMPLEX 20 MIN: CPT

## 2022-05-02 PROCEDURE — 93005 ELECTROCARDIOGRAM TRACING: CPT

## 2022-05-02 PROCEDURE — 80048 BASIC METABOLIC PNL TOTAL CA: CPT

## 2022-05-02 PROCEDURE — 85025 COMPLETE CBC W/AUTO DIFF WBC: CPT

## 2022-05-02 RX ORDER — ERGOCALCIFEROL 1.25 MG/1
50000 CAPSULE ORAL
COMMUNITY

## 2022-05-02 NOTE — PERIOP NOTES
PLEASE CONTINUE TAKING ALL PRESCRIPTION MEDICATIONS UP TO THE DAY OF SURGERY UNLESS OTHERWISE DIRECTED BELOW. DISCONTINUE all vitamins and supplements 21 days prior to surgery. DISCONTINUE Non-Steriodal Anti-Inflammatory (NSAIDS) such as Advil and Aleve 5 days prior to surgery. Home Medications to take  the day of surgery   Carvedilol             Home Medications   to Hold   None        Comments      On the day before surgery please take Acetaminophen 1000mg in the morning and then again before bed. You may substitute for Tylenol 650 mg.    Bring carlos hex and incentive spirometer       Please do not bring home medications with you on the day of surgery unless otherwise directed by your nurse. If you are instructed to bring home medications, please give them to your nurse as they will be administered by the nursing staff. If you have any questions, please call [facility] [phone] (Joint camp)  A copy of this note was provided to the patient for reference.

## 2022-05-02 NOTE — PERIOP NOTES
Lab within anesthesia guidelines  Recent Results (from the past 12 hour(s))   EKG, 12 LEAD, INITIAL    Collection Time: 05/02/22 10:32 AM   Result Value Ref Range    Ventricular Rate 86 BPM    Atrial Rate 86 BPM    P-R Interval 156 ms    QRS Duration 76 ms    Q-T Interval 394 ms    QTC Calculation (Bezet) 471 ms    Calculated P Axis 33 degrees    Calculated R Axis 47 degrees    Calculated T Axis -142 degrees    Diagnosis       Normal sinus rhythm  Anteroseptal infarct (cited on or before 07-MAR-2017)  T wave abnormality, consider inferior ischemia  Abnormal ECG  When compared with ECG of 11-MAY-2021 10:45,  Inverted T waves have replaced nonspecific T wave abnormality in Inferior   leads  Nonspecific T wave abnormality no longer evident in Anterior leads     CBC WITH AUTOMATED DIFF    Collection Time: 05/02/22 10:56 AM   Result Value Ref Range    WBC 5.1 4.3 - 11.1 K/uL    RBC 5.72 (H) 4.23 - 5.6 M/uL    HGB 14.2 13.6 - 17.2 g/dL    HCT 42.5 41.1 - 50.3 %    MCV 74.3 (L) 79.6 - 97.8 FL    MCH 24.8 (L) 26.1 - 32.9 PG    MCHC 33.4 31.4 - 35.0 g/dL    RDW 14.7 (H) 11.9 - 14.6 %    PLATELET 293 645 - 316 K/uL    MPV 11.7 9.4 - 12.3 FL    ABSOLUTE NRBC 0.00 0.0 - 0.2 K/uL    DF AUTOMATED      NEUTROPHILS 60 43 - 78 %    LYMPHOCYTES 25 13 - 44 %    MONOCYTES 10 4.0 - 12.0 %    EOSINOPHILS 4 0.5 - 7.8 %    BASOPHILS 1 0.0 - 2.0 %    IMMATURE GRANULOCYTES 0 0.0 - 5.0 %    ABS. NEUTROPHILS 3.0 1.7 - 8.2 K/UL    ABS. LYMPHOCYTES 1.3 0.5 - 4.6 K/UL    ABS. MONOCYTES 0.5 0.1 - 1.3 K/UL    ABS. EOSINOPHILS 0.2 0.0 - 0.8 K/UL    ABS. BASOPHILS 0.0 0.0 - 0.2 K/UL    ABS. IMM.  GRANS. 0.0 0.0 - 0.5 K/UL   HEMOGLOBIN A1C WITH EAG    Collection Time: 05/02/22 10:56 AM   Result Value Ref Range    Hemoglobin A1c 6.1 4.20 - 6.30 %    Est. average glucose 939 mg/dL   METABOLIC PANEL, BASIC    Collection Time: 05/02/22 10:56 AM   Result Value Ref Range    Sodium 139 136 - 145 mmol/L    Potassium 3.7 3.5 - 5.1 mmol/L    Chloride 107 98 - 107 mmol/L    CO2 30 21 - 32 mmol/L    Anion gap 2 (L) 7 - 16 mmol/L    Glucose 117 (H) 65 - 100 mg/dL    BUN 15 6 - 23 MG/DL    Creatinine 1.17 0.8 - 1.5 MG/DL    GFR est AA >60 >60 ml/min/1.73m2    GFR est non-AA >60 >60 ml/min/1.73m2    Calcium 9.3 8.3 - 10.4 MG/DL   PROTHROMBIN TIME + INR    Collection Time: 05/02/22 10:56 AM   Result Value Ref Range    Prothrombin time 13.4 12.6 - 14.5 sec    INR 1.0     PTT    Collection Time: 05/02/22 10:56 AM   Result Value Ref Range    aPTT 29.8 24.1 - 35.1 SEC

## 2022-05-02 NOTE — PROGRESS NOTES
Genny   : (26 y.o.) [facility] at [address] [place] [address]  [phone]       Physical Therapy Prehab Plan of Treatment and Evaluation Summary:2022    ICD-10: Treatment Diagnosis:   · Pain in Left Knee (M25.562)  · Stiffness of Left Knee, Not elsewhere classified (M25.662)  · Difficulty in walking, Not elsewhere classified (R26.2)  · Other abnormalities of gait and mobility (R26.89)  Precautions/Allergies:   Lisinopril  MEDICAL/REFERRING DIAGNOSIS:  Unilateral primary osteoarthritis, left knee [M17.12]  REFERRING PHYSICIAN: Lorene Smith,*  DATE OF SURGERY: 22    Assessment:   Comments:  Pt presents alone with pain in left knee. Pt had right tka in may 11 2021 with Sarah Bradley. Pt plans to return home with assistance of girlfriend following hospital stay. PROBLEM LIST (Impacting functional limitations):  Mr. Bright Jenkins presents with the following left lower extremity(s) problems:  1. Transfers  2. Gait  3. Strength  4. Range of Motion  5. Pain   INTERVENTIONS PLANNED:  1. Home Exercise Program  2. Educational Discussion      TREATMENT PLAN: Effective Dates: 2022 TO 2022. Frequency/Duration: Patient to continue to perform home exercise program at least twice per day up until his surgery. GOALS: (Goals have been discussed and agreed upon with patient.)  Discharge Goals: Time Frame: 1 Day  1. Patient will demonstrate independence with a home exercise program designed to increase strength, range of motion and pain control to minimize functional deficits and optimize patient for total joint replacement. Rehabilitation Potential For Stated Goals: Good  Regarding Alannah Smalls's therapy, I certify that the treatment plan above will be carried out by a therapist or under their direction.   Thank you for this referral,  Asaf Ford PT               HISTORY:   Present Symptoms:  Pain Intensity 1: 3  Pain Location 1: Knee  Pain Orientation 1: Left   History of Present Injury/Illness (Reason for Referral):  Medical/Referring Diagnosis: Unilateral primary osteoarthritis, left knee [M17.12]   Past Medical History/Comorbidities:   Mr. Valerio Traore  has a past medical history of Arthritis, Diabetes ([address] Hypertension, Obesity (BMI 30-39.9) (06/22/2021), and Sleep apnea. Mr. Valerio Traore  has a past surgical history that includes hx colonoscopy; hx knee arthroscopy; hx knee replacement (Right, 05/2021); and hx endoscopy. Social History/Living Environment:   Home Environment: Private residence  # Steps to Enter: 0  One/Two Story Residence: One story  Living Alone: Yes  Support Systems: Other (Comment) (girlfriend)  Patient Expects to be Discharged to[de-identified] Home  Tub or Shower Type: Tub/Shower combination    Work/Activity:  Employment requires light activity, wants to play with son who plays high school football and [de-identified] year old daughter.   Dominant Side:  RIGHT  Current Medications:  See Pre-assessment nursing note   Number of Personal Factors/Comorbidities that affect the Plan of Care: 0: LOW COMPLEXITY   EXAMINATION:   ADLs (Current Functional Status):   Ambulation:  [x] Independent  [] Walk Indoors Only  [] Walk Outdoors  [] Use Assistive Device  [] Use Wheelchair Only Dressing:  [x] Independent  Requires Assistance from Someone for:  [] Sock/Shoes  [] Pants  [] Everything   Bathing/Showering:   [x] Independent  [] Requires Assistance from Someone  [] Sponge Bath Only Household Activities:  [x] Routine house and yard work  [] Light Housework Only  [] None   Observation/Orthostatic Postural Assessment:   Within defined limits   ROM/Flexibility:   Gross Assessment: Yes  AROM: Generally decreased, functional                LLE Assessment  LLE Assessment (WDL): (P) Within defined limits  LLE AROM  L Knee Flexion: 105  L Knee Extension: 0      RLE Assessment  RLE Assessment (WDL): Within defined limits   Strength:   Gross Assessment: Yes  Strength: Generally decreased, functional                  Functional Mobility:    Gross Assessment: Yes  Sensation: Intact    Gait Description (WDL): Within defined limits  Stand to Sit: Independent  Sit to Stand: Independent  Distance (ft): 300 Feet (ft)  Ambulation - Level of Assistance: Independent  Gait Abnormalities: Antalgic          Balance:    Sitting: Intact  Standing: Intact   Body Structures Involved:  1. Bones  2. Joints  3. Muscles  4. Ligaments Body Functions Affected:  1. Neuromusculoskeletal  2. Movement Related Activities and Participation Affected:  1. Mobility   Number of elements that affect the Plan of Care: 4+: HIGH COMPLEXITY   CLINICAL PRESENTATION:   Presentation: Stable and uncomplicated: LOW COMPLEXITY   CLINICAL DECISION MAKING:   Tool Used: Knee injury and Osteoarthritis Outcome Score for Joint Replacement (KOOS, JR)  Score:  Initial: 11 (Interval: 59.381) 5/2/2022 Most Recent: TBD   Interpretation of Score: The KOOS, JR contains 7 items from the original KOOS survey. Items are coded from 0 to 4, none to extreme respectively. Yadira Dural is scored by summing the raw response (range 0-28) and then converting it to an interval score using the table provided below. The interval score ranges from 0 to 100 where 0 represents total knee disability and 100 represents perfect knee health. Medical Necessity:   · Mr. Larissa Stevens is expected to optimize his lower extremity strength and ROM in preparation for joint replacement surgery. Reason for Services/Other Comments:  · Achieve baseline assesment of musculoskeletal system, functional mobility and home environment. , educate in PT HEP in preparation for surgery, educate in hospital plan of care. Use of outcome tool(s) and clinical judgement create a POC that gives a: Clear prediction of patient's progress: LOW COMPLEXITY   TREATMENT:   Treatment/Session Assessment:  Patient was instructed in PT- HEP to increase strength and ROM in LEs.   Answered all questions. · Post session pain:  3  · Compliance with Program/Exercises: compliant most of the time.   Total Treatment Duration:  PT Patient Time In/Time Out  Time In: 1100  Time Out: Brandon Carcamo PT

## 2022-05-02 NOTE — ADVANCED PRACTICE NURSE
Total Joint Surgery Preoperative Chart Review      Patient ID:  Swati Monday  [id number]  26 y.o.  1970  Surgeon: Dr. Paulette Donaldson  Date of Surgery: 5/19/2022  Procedure: Total Left Knee Arthroplasty  Primary Care Physician: Elier Isaacs -[phone]  Specialty Physician(s):      Subjective:   Swati Monday is a 45 y.o. BLACK/ male who presents for preoperative evaluation for Total Left Knee arthroplasty. This is a preoperative chart review note based on data collected by the nurse at the surgical Pre-Assessment visit. Past Medical History:   Diagnosis Date    Arthritis     OA    Diabetes ([address]     insulin- fbs avg 100- A1C 6.0  (5/2021) hyposymptoms at 61    Hypertension     carvedilol, losart-hct    Obesity (BMI 30-39.9) 06/22/2021    BMI 37.07    Sleep apnea     wears CPAP      Past Surgical History:   Procedure Laterality Date    HX COLONOSCOPY      HX ENDOSCOPY      HX KNEE ARTHROSCOPY      HX KNEE REPLACEMENT Right 05/2021     History reviewed. No pertinent family history. Social History     Tobacco Use    Smoking status: Never Smoker    Smokeless tobacco: Never Used   Substance Use Topics    Alcohol use: Yes     Comment: occasionally       Prior to Admission medications    Medication Sig Start Date End Date Taking? Authorizing Provider   ergocalciferol (Vitamin D2) 1,250 mcg (50,000 unit) capsule Take 50,000 Units by mouth every seven (7) days. Yes Provider, Historical   losartan-hydroCHLOROthiazide (HYZAAR) 100-25 mg per tablet Take 1 Tablet by mouth daily. Yes Provider, Historical   semaglutide (Ozempic) 1 mg/dose (2 mg/1.5 mL) sub-q pen 1 mg by SubCUTAneous route every seven (7) days. Indications: Takes on Fridays   Yes Provider, Historical   carvediloL (COREG) 12.5 mg tablet Take 12.5 mg by mouth two (2) times daily (with meals). Yes Provider, Historical   lancets misc by Not Applicable route.  8/27/20   Provider, Historical     Allergies Allergen Reactions    Lisinopril Cough          Objective:     Physical Exam:   Patient Vitals for the past 24 hrs:   Temp Pulse Resp SpO2   05/02/22 1027 97 °F (36.1 °C) 82 18 98 %       ECG:    EKG Results     Procedure 720 Value Units Date/Time    EKG, 12 LEAD, INITIAL [[id number]] Collected: 05/02/22 1032    Order Status: Completed Updated: 05/02/22 1103     Ventricular Rate 86 BPM      Atrial Rate 86 BPM      P-R Interval 156 ms      QRS Duration 76 ms      Q-T Interval 394 ms      QTC Calculation (Bezet) 471 ms      Calculated P Axis 33 degrees      Calculated R Axis 47 degrees      Calculated T Axis -142 degrees      Diagnosis --     Normal sinus rhythm  Anteroseptal infarct (cited on or before 07-MAR-2017)  T wave abnormality, consider inferior ischemia  Abnormal ECG  When compared with ECG of 11-MAY-2021 10:45,  Inverted T waves have replaced nonspecific T wave abnormality in Inferior   leads  Nonspecific T wave abnormality no longer evident in Anterior leads            Data Review:   Labs:   Recent Results (from the past 24 hour(s))   EKG, 12 LEAD, INITIAL    Collection Time: 05/02/22 10:32 AM   Result Value Ref Range    Ventricular Rate 86 BPM    Atrial Rate 86 BPM    P-R Interval 156 ms    QRS Duration 76 ms    Q-T Interval 394 ms    QTC Calculation (Bezet) 471 ms    Calculated P Axis 33 degrees    Calculated R Axis 47 degrees    Calculated T Axis -142 degrees    Diagnosis       Normal sinus rhythm  Anteroseptal infarct (cited on or before 07-MAR-2017)  T wave abnormality, consider inferior ischemia  Abnormal ECG  When compared with ECG of 11-MAY-2021 10:45,  Inverted T waves have replaced nonspecific T wave abnormality in Inferior   leads  Nonspecific T wave abnormality no longer evident in Anterior leads     CBC WITH AUTOMATED DIFF    Collection Time: 05/02/22 10:56 AM   Result Value Ref Range    WBC 5.1 4.3 - 11.1 K/uL    RBC 5.72 (H) 4.23 - 5.6 M/uL    HGB 14.2 13.6 - 17.2 g/dL    HCT 42.5 41.1 - 50.3 %    MCV 74.3 (L) 79.6 - 97.8 FL    MCH 24.8 (L) 26.1 - 32.9 PG    MCHC 33.4 31.4 - 35.0 g/dL    RDW 14.7 (H) 11.9 - 14.6 %    PLATELET 844 971 - 795 K/uL    MPV 11.7 9.4 - 12.3 FL    ABSOLUTE NRBC 0.00 0.0 - 0.2 K/uL    DF AUTOMATED      NEUTROPHILS 60 43 - 78 %    LYMPHOCYTES 25 13 - 44 %    MONOCYTES 10 4.0 - 12.0 %    EOSINOPHILS 4 0.5 - 7.8 %    BASOPHILS 1 0.0 - 2.0 %    IMMATURE GRANULOCYTES 0 0.0 - 5.0 %    ABS. NEUTROPHILS 3.0 1.7 - 8.2 K/UL    ABS. LYMPHOCYTES 1.3 0.5 - 4.6 K/UL    ABS. MONOCYTES 0.5 0.1 - 1.3 K/UL    ABS. EOSINOPHILS 0.2 0.0 - 0.8 K/UL    ABS. BASOPHILS 0.0 0.0 - 0.2 K/UL    ABS. IMM.  GRANS. 0.0 0.0 - 0.5 K/UL   HEMOGLOBIN A1C WITH EAG    Collection Time: 05/02/22 10:56 AM   Result Value Ref Range    Hemoglobin A1c 6.1 4.20 - 6.30 %    Est. average glucose 508 mg/dL   METABOLIC PANEL, BASIC    Collection Time: 05/02/22 10:56 AM   Result Value Ref Range    Sodium 139 136 - 145 mmol/L    Potassium 3.7 3.5 - 5.1 mmol/L    Chloride 107 98 - 107 mmol/L    CO2 30 21 - 32 mmol/L    Anion gap 2 (L) 7 - 16 mmol/L    Glucose 117 (H) 65 - 100 mg/dL    BUN 15 6 - 23 MG/DL    Creatinine 1.17 0.8 - 1.5 MG/DL    GFR est AA >60 >60 ml/min/1.73m2    GFR est non-AA >60 >60 ml/min/1.73m2    Calcium 9.3 8.3 - 10.4 MG/DL   PROTHROMBIN TIME + INR    Collection Time: 05/02/22 10:56 AM   Result Value Ref Range    Prothrombin time 13.4 12.6 - 14.5 sec    INR 1.0     PTT    Collection Time: 05/02/22 10:56 AM   Result Value Ref Range    aPTT 29.8 24.1 - 35.1 SEC         Problem List:  )  Patient Active Problem List   Diagnosis Code    Arthritis of right knee M17.11    Acute diverticulitis K57.92    Acute kidney injury ([address] N17.9    Chest pain R07.9    Chronic pain of left knee M25.562, G89.29    Dehydration E86.0    Diarrhea in adult patient R19.7    Essential hypertension B40    Helicobacter pylori infection A04.8    Hyperosmolar hyperglycemic coma due to diabetes mellitus without ketoacidosis ([facility] E11.01    Hypertriglyceridemia E78.1    Lower abdominal pain R10.30    Microcytic anemia D50.9    Primary osteoarthritis of right knee M17.11    Rectal bleeding K62.5    Syncope R55    Venous insufficiency I87.2    Weight loss R63.4    Snoring R06.83       Total Joint Surgery Pre-Assessment Recommendations:           Reported elevated BP by PAT RN. Pt had stopped taking his medications. He was instructed to restart medications ASAP and see PCP. Patient reports the symptoms of snoring, fatigue, observed apnea and /or excessive daytime sleepiness. Will refer patient for HST based on above assessment. Recommend continuous saturation monitoring hours of sleep, during hospitalization.     Signed By: NATASHA Faustin    May 2, 2022

## 2022-05-02 NOTE — PERIOP NOTES
Patient verified name and . Order for consent was found in EHR and matches case posting; patient verified. Robot-assisted left total knee arthroplasty       Type 3 surgery, PAT joint camp assessment complete. Labs per surgeon: CBC,BMP, PT/PTT, HgbA1c and MRSA swab ; results within anesthesia guidelines  Labs per anesthesia protocol: no additional  EKG:Done today and will have anesthesia to review    Pt had stress ECHO 2020 and found in [facility] for anesthesia reference. Pt had elevated B/P at joint [place] 158/118 (taken manually). After talking with pt, pt states has not taken blood pressure medication \"since Friday\". Pt instructed to take BP ASAP once home and to not miss doses in future (verbalized understanding) and this RN called [address] and made appt for pt to be seen by Maricel Godfrey NP tomorrow 2022 @ 10:20. Pt is aware of appointment date and time. Claribel Rodriguez NP notified of BP and non compliance of pt with BP meds      MRSA/MSSA swab collected; pharmacy to review and dose antibiotic as appropriate. Hospital approved surgical skin cleanser and instructions to return bottle on DOS given per hospital policy. Patient provided with handouts including Guide to Surgery, Pain Management, Hand Hygiene, Blood Transfusion Education, and [place] [facility] Brochure. Patient answered medical/surgical history questions at their best of ability. All prior to admission medications documented in [facility]. Original medication prescription bottle not visualized during patient appointment. Patient instructed to hold all vitamins 3 weeks prior to surgery and NSAIDS 5 days prior to surgery. Patient teach back successful and patient demonstrates knowledge of instruction.

## 2022-05-03 RX ORDER — ATORVASTATIN CALCIUM 40 MG/1
40 TABLET, FILM COATED ORAL DAILY
COMMUNITY

## 2022-05-03 NOTE — PERIOP NOTES
Dr. Nahomi Puente reviewed chart - states \"ok if no CP/SOB. Will need to get back on BP meds prior to surgery. \" [id number] Mat[address] jennings. Called and spoke with patient and notified of anesthesia response, pt verbalized understanding. He states that he went to PCP today for follow up and has an upcoming appt on 05/17 with PCP. He states he was given a new prescription for Lipitor 40 mg daily, PTA med list updated. Patient states no CP/SOB.

## 2022-06-09 ENCOUNTER — APPOINTMENT (OUTPATIENT)
Dept: PHYSICAL THERAPY | Age: 52
End: 2022-06-09

## 2022-06-10 ENCOUNTER — APPOINTMENT (OUTPATIENT)
Dept: PHYSICAL THERAPY | Age: 52
End: 2022-06-10

## 2022-06-13 ENCOUNTER — APPOINTMENT (OUTPATIENT)
Dept: PHYSICAL THERAPY | Age: 52
End: 2022-06-13

## 2022-06-15 ENCOUNTER — APPOINTMENT (OUTPATIENT)
Dept: PHYSICAL THERAPY | Age: 52
End: 2022-06-15

## 2022-06-17 ENCOUNTER — APPOINTMENT (OUTPATIENT)
Dept: PHYSICAL THERAPY | Age: 52
End: 2022-06-17

## 2022-06-20 ENCOUNTER — APPOINTMENT (OUTPATIENT)
Dept: PHYSICAL THERAPY | Age: 52
End: 2022-06-20

## 2022-06-22 ENCOUNTER — APPOINTMENT (OUTPATIENT)
Dept: PHYSICAL THERAPY | Age: 52
End: 2022-06-22

## 2022-06-24 ENCOUNTER — APPOINTMENT (OUTPATIENT)
Dept: PHYSICAL THERAPY | Age: 52
End: 2022-06-24

## 2022-06-27 ENCOUNTER — APPOINTMENT (OUTPATIENT)
Dept: PHYSICAL THERAPY | Age: 52
End: 2022-06-27

## 2022-06-29 ENCOUNTER — APPOINTMENT (OUTPATIENT)
Dept: PHYSICAL THERAPY | Age: 52
End: 2022-06-29

## 2023-04-05 ENCOUNTER — HOSPITAL ENCOUNTER (EMERGENCY)
Age: 53
Discharge: HOME | End: 2023-04-05
Attending: EMERGENCY MEDICINE
Payer: COMMERCIAL

## 2023-04-05 VITALS
RESPIRATION RATE: 20 BRPM | SYSTOLIC BLOOD PRESSURE: 155 MMHG | WEIGHT: 280 LBS | HEIGHT: 70 IN | HEART RATE: 103 BPM | TEMPERATURE: 99 F | OXYGEN SATURATION: 96 % | DIASTOLIC BLOOD PRESSURE: 105 MMHG | BODY MASS INDEX: 40.09 KG/M2

## 2023-04-05 DIAGNOSIS — I10 ESSENTIAL HYPERTENSION: ICD-10-CM

## 2023-04-05 DIAGNOSIS — R60.0 BILATERAL LEG EDEMA: Primary | ICD-10-CM

## 2023-04-05 LAB
ALBUMIN SERPL-MCNC: 3.6 G/DL (ref 3.5–5)
ALBUMIN/GLOB SERPL: 0.9 (ref 0.4–1.6)
ALP SERPL-CCNC: 99 U/L (ref 50–136)
ALT SERPL-CCNC: 59 U/L (ref 12–65)
ANION GAP SERPL CALC-SCNC: 8 MMOL/L (ref 2–11)
AST SERPL-CCNC: 42 U/L (ref 15–37)
BILIRUB SERPL-MCNC: 0.5 MG/DL (ref 0.2–1.1)
BUN SERPL-MCNC: 13 MG/DL (ref 6–23)
CALCIUM SERPL-MCNC: 9.6 MG/DL (ref 8.3–10.4)
CHLORIDE SERPL-SCNC: 110 MMOL/L (ref 101–110)
CO2 SERPL-SCNC: 25 MMOL/L (ref 21–32)
CREAT SERPL-MCNC: 1.3 MG/DL (ref 0.8–1.5)
ERYTHROCYTE [DISTWIDTH] IN BLOOD BY AUTOMATED COUNT: 14.8 % (ref 11.9–14.6)
GLOBULIN SER CALC-MCNC: 3.9 G/DL (ref 2.8–4.5)
GLUCOSE SERPL-MCNC: 95 MG/DL (ref 65–100)
HCT VFR BLD AUTO: 39.5 % (ref 41.1–50.3)
HGB BLD-MCNC: 13 G/DL (ref 13.6–17.2)
MCH RBC QN AUTO: 25 PG (ref 26.1–32.9)
MCHC RBC AUTO-ENTMCNC: 32.9 G/DL (ref 31.4–35)
MCV RBC AUTO: 75.8 FL (ref 82–102)
NRBC # BLD: 0 K/UL (ref 0–0.2)
NT PRO BNP: 18 PG/ML (ref 5–125)
PLATELET # BLD AUTO: 183 K/UL (ref 150–450)
PMV BLD AUTO: 10.1 FL (ref 9.4–12.3)
POTASSIUM SERPL-SCNC: 3.7 MMOL/L (ref 3.5–5.1)
PROT SERPL-MCNC: 7.5 G/DL (ref 6.3–8.2)
RBC # BLD AUTO: 5.21 M/UL (ref 4.23–5.6)
SODIUM SERPL-SCNC: 143 MMOL/L (ref 133–143)
WBC # BLD AUTO: 6.1 K/UL (ref 4.3–11.1)

## 2023-04-05 PROCEDURE — 93005 ELECTROCARDIOGRAM TRACING: CPT

## 2023-04-05 PROCEDURE — 80053 COMPREHEN METABOLIC PANEL: CPT

## 2023-04-05 PROCEDURE — 99284 EMERGENCY DEPT VISIT MOD MDM: CPT | Performed by: EMERGENCY MEDICINE

## 2023-04-05 PROCEDURE — 6360000002 HC RX W HCPCS

## 2023-04-05 PROCEDURE — 83880 ASSAY OF NATRIURETIC PEPTIDE: CPT

## 2023-04-05 PROCEDURE — 85027 COMPLETE CBC AUTOMATED: CPT

## 2023-04-05 PROCEDURE — 96374 THER/PROPH/DIAG INJ IV PUSH: CPT | Performed by: EMERGENCY MEDICINE

## 2023-04-05 RX ORDER — FUROSEMIDE 10 MG/ML
40 INJECTION INTRAMUSCULAR; INTRAVENOUS ONCE
Status: COMPLETED | OUTPATIENT
Start: 2023-04-05 | End: 2023-04-05

## 2023-04-05 RX ADMIN — FUROSEMIDE 40 MG: 10 INJECTION, SOLUTION INTRAMUSCULAR; INTRAVENOUS at 21:16

## 2023-04-05 ASSESSMENT — PAIN DESCRIPTION - DESCRIPTORS: DESCRIPTORS: ACHING;STABBING;THROBBING

## 2023-04-05 ASSESSMENT — LIFESTYLE VARIABLES
HOW MANY STANDARD DRINKS CONTAINING ALCOHOL DO YOU HAVE ON A TYPICAL DAY: 1 OR 2
HOW OFTEN DO YOU HAVE A DRINK CONTAINING ALCOHOL: MONTHLY OR LESS

## 2023-04-05 ASSESSMENT — PAIN SCALES - GENERAL: PAINLEVEL_OUTOF10: 10

## 2023-04-05 ASSESSMENT — PAIN - FUNCTIONAL ASSESSMENT: PAIN_FUNCTIONAL_ASSESSMENT: 0-10

## 2023-04-05 ASSESSMENT — PAIN DESCRIPTION - LOCATION: LOCATION: LEG

## 2023-04-05 ASSESSMENT — PATIENT HEALTH QUESTIONNAIRE - PHQ9: SUM OF ALL RESPONSES TO PHQ QUESTIONS 1-9: 5

## 2023-04-05 ASSESSMENT — PAIN DESCRIPTION - ORIENTATION: ORIENTATION: LEFT

## 2023-04-05 ASSESSMENT — ENCOUNTER SYMPTOMS: SHORTNESS OF BREATH: 0

## 2023-04-06 LAB
EKG ATRIAL RATE: 94 BPM
EKG DIAGNOSIS: NORMAL
EKG P AXIS: 46 DEGREES
EKG P-R INTERVAL: 170 MS
EKG Q-T INTERVAL: 366 MS
EKG QRS DURATION: 74 MS
EKG QTC CALCULATION (BAZETT): 457 MS
EKG R AXIS: 54 DEGREES
EKG T AXIS: -83 DEGREES
EKG VENTRICULAR RATE: 94 BPM

## 2023-04-06 NOTE — DISCHARGE INSTRUCTIONS
Elevate legs and wear compression stockings while up and working. Return for any concerns. Please follow up with cardiology.

## 2023-04-06 NOTE — ED PROVIDER NOTES
Appearance: Normal appearance. He is not ill-appearing or toxic-appearing. HENT:      Head: Normocephalic. Nose: Nose normal.      Mouth/Throat:      Mouth: Mucous membranes are moist.   Eyes:      Extraocular Movements: Extraocular movements intact. Pupils: Pupils are equal, round, and reactive to light. Cardiovascular:      Rate and Rhythm: Normal rate and regular rhythm. Pulses: Normal pulses. Heart sounds: Normal heart sounds. Pulmonary:      Effort: Pulmonary effort is normal. No respiratory distress. Breath sounds: Normal breath sounds. Musculoskeletal:         General: Swelling present. No tenderness. Normal range of motion. Right lower leg: Edema present. Left lower leg: Edema present. Comments: +2 edema to bilateral lower extremities, no cellulitic changes. Skin:     General: Skin is warm. Capillary Refill: Capillary refill takes less than 2 seconds. Neurological:      General: No focal deficit present. Mental Status: He is alert and oriented to person, place, and time. Psychiatric:         Mood and Affect: Mood normal.         Behavior: Behavior normal.         Thought Content:  Thought content normal.         Judgment: Judgment normal.        Procedures     Procedures    Orders Placed This Encounter   Procedures    CBC    CMP    Brain Natriuretic Peptide    [facility] - Tony Esqueda MD, Cardiology, [facility]    EKG 12 Lead    Insert peripheral IV STAT        Medications   furosemide (LASIX) injection 40 mg (40 mg IntraVENous Given 4/5/23 2116)       New Prescriptions    No medications on file        Past Medical History:   Diagnosis Date    Arthritis     OA    Diabetes ([facility]     insulin- fbs avg 100- A1C 6.0  (5/2021) hyposymptoms at 60    Hypertension     carvedilol, losart-hct    Obesity (BMI 30-39.9) 06/22/2021    BMI 37.07    Sleep apnea     wears CPAP        Past Surgical History:   Procedure Laterality Date    COLONOSCOPY      KNEE

## 2023-04-06 NOTE — ED TRIAGE NOTES
Pain in both legs with L (9.5/10) > R (5/10). Pain is associated with swelling and began about 1.5 months ago. Pt has difficulty elevated his legs while working 2 jobs, one in which he stands and the other in which is able to be seated. Hx of diabetes.

## 2023-05-05 ENCOUNTER — INITIAL CONSULT (OUTPATIENT)
Dept: CARDIOLOGY CLINIC | Age: 53
End: 2023-05-05
Payer: COMMERCIAL

## 2023-05-05 VITALS
HEIGHT: 70 IN | HEART RATE: 95 BPM | WEIGHT: 306 LBS | DIASTOLIC BLOOD PRESSURE: 80 MMHG | BODY MASS INDEX: 43.81 KG/M2 | SYSTOLIC BLOOD PRESSURE: 138 MMHG

## 2023-05-05 DIAGNOSIS — R60.0 LOWER EXTREMITY EDEMA: Primary | ICD-10-CM

## 2023-05-05 PROCEDURE — 3078F DIAST BP <80 MM HG: CPT | Performed by: INTERNAL MEDICINE

## 2023-05-05 PROCEDURE — 3074F SYST BP LT 130 MM HG: CPT | Performed by: INTERNAL MEDICINE

## 2023-05-05 PROCEDURE — 99204 OFFICE O/P NEW MOD 45 MIN: CPT | Performed by: INTERNAL MEDICINE

## 2023-05-05 RX ORDER — ATORVASTATIN CALCIUM 40 MG/1
40 TABLET, FILM COATED ORAL DAILY
COMMUNITY
Start: 2023-02-21

## 2023-05-08 NOTE — PROGRESS NOTES
daily (with meals)   Yes Ar Automatic Reconciliation   losartan-hydroCHLOROthiazide (HYZAAR) 100-25 MG per tablet Take 1 tablet by mouth daily   Yes Ar Automatic Reconciliation   Semaglutide, 1 MG/DOSE, (OZEMPIC, 1 MG/DOSE,) 2 MG/1.5ML SOPN Inject 1 mg into the skin every 7 days   Yes Ar Automatic Reconciliation   Lancets MISC by NOT APPLICABLE route 4/26/58   Ar Automatic Reconciliation     Allergies   Allergen Reactions    Lisinopril Other (See Comments)     Past Medical History:   Diagnosis Date    Arthritis     OA    Diabetes ([address]     insulin- fbs avg 100- A1C 6.0  (5/2021) hyposymptoms at 60    Hypertension     carvedilol, losart-hct    Obesity (BMI 30-39.9) 06/22/2021    BMI 37.07    Sleep apnea     wears CPAP     Past Surgical History:   Procedure Laterality Date    COLONOSCOPY      KNEE ARTHROSCOPY      TOTAL KNEE ARTHROPLASTY Right 05/2021    UPPER GASTROINTESTINAL ENDOSCOPY       History reviewed. No pertinent family history. Social History     Tobacco Use    Smoking status: Never    Smokeless tobacco: Never   Substance Use Topics    Alcohol use: Yes       ROS:    Review of Systems   All other systems reviewed and are negative. PHYSICAL EXAM:   /80   Pulse 95   Ht 5' 10\" (1.778 m)   Wt (!) 306 lb (138.8 kg)   BMI 43.91 kg/m²      Physical Exam  Constitutional:       General: He is not in acute distress. Appearance: Normal appearance. HENT:      Head: Normocephalic and atraumatic. Eyes:      General: No scleral icterus. Neck:      Vascular: No carotid bruit. Cardiovascular:      Rate and Rhythm: Normal rate and regular rhythm. Pulmonary:      Breath sounds: Normal breath sounds. Abdominal:      General: Abdomen is flat. Palpations: Abdomen is soft. Musculoskeletal:      Cervical back: Neck supple. Right lower leg: Edema present. Left lower leg: Edema present. Skin:     General: Skin is warm and dry.    Neurological:      Mental Status: He is alert and

## 2023-05-22 ENCOUNTER — TELEPHONE (OUTPATIENT)
Age: 53
End: 2023-05-22

## 2023-05-22 NOTE — TELEPHONE ENCOUNTER
----- Message from Sadaf Cueva MA sent at 5/22/2023 12:07 PM EDT -----    ----- Message -----  From: Chandan Alejandre MD  Sent: 5/22/2023  11:50 AM EDT  To: Conrad George MA    Please let patient know that there echo showed increased wall thickness, consistent with high blood pressure. No other significant abnormalities were noted. He can follow-up with me as previously scheduled.

## 2023-05-22 NOTE — TELEPHONE ENCOUNTER
----- Message from Sandy Zuniga MA sent at 5/22/2023 12:07 PM EDT -----    ----- Message -----  From: Mariusz Frost MD  Sent: 5/22/2023  11:50 AM EDT  To: Fady Farrell MA    Please let patient know that there echo showed increased wall thickness, consistent with high blood pressure. No other significant abnormalities were noted. He can follow-up with me as previously scheduled.

## 2023-05-23 ENCOUNTER — TELEPHONE (OUTPATIENT)
Age: 53
End: 2023-05-23

## 2023-05-23 NOTE — TELEPHONE ENCOUNTER
Spoke to pt and informed him regarding Dr. Danita Cotto results from echo. Pt verbalized understanding and confirmed July app.

## 2023-05-23 NOTE — TELEPHONE ENCOUNTER
Miriam Mar please call patient back. I copy and pasted the note from yesterday. You had already closed the note before he could return your call. May 22, 2023  Mack Hicks [place]     3:27 PM  Note     ----- Message from Latricia Gaviria sent at 5/22/2023 12:07 PM EDT -----     ----- Message -----  From: Kurt Smyth MD  Sent: 5/22/2023  11:50 AM EDT  To: Frank Ham MA     Please let patient know that there echo showed increased wall thickness, consistent with high blood pressure. No other significant abnormalities were noted. He can follow-up with me as previously scheduled.      3:28 PM    Mack Hicks MA contacted Dillonbryce Trevor     3:28 PM  Mack Hicks MA routed this conversation to JANELLE Gaviria MA     KL     4:00 PM  Note     LVM to return call to [facility]     4:00 PM  Mack Hicks MA routed this conversation to Mack Hicks MA    Additional Documentation    Encounter Info:  Billing Info,  History,  Allergies,  Detailed Report       Communications    View Encounter Conversation Summary  [facility] Phone Call    No notes of this type exist for this encounter. Chart Review Routing History    No routing history on file.   Routing History    Priority Sent On From To Message Type    5/22/2023  4:00 PM Latricia Gaviria [address] Patient Calls    5/22/2023  3:28 PM Latricia Gaviria MA Patient Calls     Created by    Mack Hicks MA on 05/22/2023 03:27 PM     Encounter Status    Signed by Mack Hicks MA on 5/22/23 at 16:52  Orders Placed    None  Medication Renewals and Changes      None     Medication List     Visit Diagnoses      None     Problem List     Visit Pharmacy    [facility] [address] [facility]

## 2023-07-13 ENCOUNTER — OFFICE VISIT (OUTPATIENT)
Age: 53
End: 2023-07-13
Payer: COMMERCIAL

## 2023-07-13 VITALS
HEART RATE: 80 BPM | WEIGHT: 313 LBS | DIASTOLIC BLOOD PRESSURE: 90 MMHG | SYSTOLIC BLOOD PRESSURE: 146 MMHG | BODY MASS INDEX: 44.81 KG/M2 | HEIGHT: 70 IN

## 2023-07-13 DIAGNOSIS — R60.0 LOWER EXTREMITY EDEMA: Primary | ICD-10-CM

## 2023-07-13 PROCEDURE — 3080F DIAST BP >= 90 MM HG: CPT | Performed by: INTERNAL MEDICINE

## 2023-07-13 PROCEDURE — 99214 OFFICE O/P EST MOD 30 MIN: CPT | Performed by: INTERNAL MEDICINE

## 2023-07-13 PROCEDURE — 3077F SYST BP >= 140 MM HG: CPT | Performed by: INTERNAL MEDICINE

## 2023-07-13 NOTE — PROGRESS NOTES
[facility] CARDIOLOGY  [address] [place] [address]  PHONE: [phone]      23    NAME:  Roni Zapata  : 1970  MRN: [id number]         SUBJECTIVE:   Roni Zapata is a 47 y.o. male seen for a follow up visit regarding the following:     Chief Complaint   Patient presents with    Follow-up     2 months    Hypertension            HPI:  Follow up  Follow-up (2 months) and Hypertension   . Mr. Elena Clancy presents today for follow-up. Patient was previously evaluated for lower extremity swelling. He had an echocardiogram which showed some LVH but otherwise no significant abnormalities. Filling pressures were normal at that time. Patient had previously had a BNP which was normal.  His physical examination previously had revealed no other signs or symptoms to suggest congestive heart failure. Patient is doing well today has no complaints of aside from continued swelling of his lower extremities. Hypertension          Key CAD CHF Meds            atorvastatin (LIPITOR) 40 MG tablet (Taking)    Class: Historical Med    carvedilol (COREG) 12.5 MG tablet (Taking)    Class: Historical Med    losartan-hydroCHLOROthiazide (HYZAAR) 100-25 MG per tablet (Taking)    Class: Historical Med          Key Antihyperglycemic Medications            Semaglutide, 1 MG/DOSE, (OZEMPIC, 1 MG/DOSE,) 2 MG/1.5ML SOPN (Taking)    Class: Historical Med                Past Medical History, Past Surgical History, Family history, Social History, and Medications were all reviewed with the patient today and updated as necessary. Prior to Admission medications    Medication Sig Start Date End Date Taking?  Authorizing Provider   atorvastatin (LIPITOR) 40 MG tablet Take 1 tablet by mouth daily 23  Yes Historical Provider, MD   carvedilol (COREG) 12.5 MG tablet Take 1 tablet by mouth 2 times daily (with meals)   Yes Ar Automatic Reconciliation   losartan-hydroCHLOROthiazide (HYZAAR)

## 2025-02-19 ENCOUNTER — OFFICE VISIT (OUTPATIENT)
Dept: GASTROENTEROLOGY | Age: 55
End: 2025-02-19
Payer: COMMERCIAL

## 2025-02-19 ENCOUNTER — PREP FOR PROCEDURE (OUTPATIENT)
Dept: GASTROENTEROLOGY | Age: 55
End: 2025-02-19

## 2025-02-19 VITALS
DIASTOLIC BLOOD PRESSURE: 76 MMHG | BODY MASS INDEX: 45.91 KG/M2 | HEIGHT: 69 IN | RESPIRATION RATE: 15 BRPM | HEART RATE: 99 BPM | WEIGHT: 310 LBS | TEMPERATURE: 98 F | OXYGEN SATURATION: 94 % | SYSTOLIC BLOOD PRESSURE: 126 MMHG

## 2025-02-19 DIAGNOSIS — Z12.11 ENCOUNTER FOR SCREENING COLONOSCOPY: ICD-10-CM

## 2025-02-19 DIAGNOSIS — Z12.11 COLON CANCER SCREENING: ICD-10-CM

## 2025-02-19 DIAGNOSIS — K52.9 CHRONIC DIARRHEA: Primary | ICD-10-CM

## 2025-02-19 PROBLEM — R19.7 DIARRHEA: Status: ACTIVE | Noted: 2025-02-19

## 2025-02-19 PROCEDURE — 3078F DIAST BP <80 MM HG: CPT | Performed by: STUDENT IN AN ORGANIZED HEALTH CARE EDUCATION/TRAINING PROGRAM

## 2025-02-19 PROCEDURE — 99204 OFFICE O/P NEW MOD 45 MIN: CPT | Performed by: STUDENT IN AN ORGANIZED HEALTH CARE EDUCATION/TRAINING PROGRAM

## 2025-02-19 PROCEDURE — 3074F SYST BP LT 130 MM HG: CPT | Performed by: STUDENT IN AN ORGANIZED HEALTH CARE EDUCATION/TRAINING PROGRAM

## 2025-02-19 NOTE — PROGRESS NOTES
Tobacco Use    Smoking status: Never     Passive exposure: Past    Smokeless tobacco: Never   Substance Use Topics    Alcohol use: Yes    Drug use: Never     Allergies   Allergen Reactions    Lisinopril Other (See Comments)     Current Outpatient Medications   Medication Instructions    atorvastatin (LIPITOR) 40 mg, Oral, DAILY    carvedilol (COREG) 12.5 mg, Oral, 2 TIMES DAILY WITH MEALS    Lancets MISC NOT APPLICABLE    losartan-hydroCHLOROthiazide (HYZAAR) 100-25 MG per tablet 1 tablet, Oral, DAILY    Ozempic (1 MG/DOSE) 1 mg, SubCUTAneous, EVERY 7 DAYS     Review of Systems    ROS:    A complete 11 system ROS was performed and was negative aside from the pertinent negative and positives noted above.     PE:   There were no vitals filed for this visit.   General:  The patient appears well-nourished, and is in no acute distress.    Skin:  no rash, ulcers. No Bleeding or signs of infection.  HEENT:  Normocephalic, atraumatic. No sclerae icterus.   Neck:  No pain on palpation or mobilization of the neck.  Respiratory: Respiratory effort is normal. Expansion maintained bilaterally and symmetrically. Normal breath sounds and clear to auscultation bilaterally without wheezes, rales, or rhonchi.    Cardiovascular:  Regular rate and rhythm.     Abdomen:  Soft, non tender to palpation. No distention. Normoactive bowel sounds present.    Extremities: No edema bilaterally. No erythema  Neurologic:  Alert and oriented x3.  No sensory deficits. No asterixis  Psychiatric: Appropriate mood and affect.  Musculoskeletal: Strength and tone are symmetrical and maintained.    Assessment & Plan  1. Diarrhea.  The patient's symptoms suggest a potential diagnosis of small bowel bacterial overgrowth or hyperactive bowel function. He reports experiencing diarrhea within 15 to 30 minutes after eating, regardless of the type of food consumed. This symptom has been ongoing for over a year, predating his initiation of Ozempic therapy,

## 2025-02-20 RX ORDER — SODIUM CHLORIDE 9 MG/ML
25 INJECTION, SOLUTION INTRAVENOUS PRN
Status: CANCELLED | OUTPATIENT
Start: 2025-02-20

## 2025-02-20 RX ORDER — SODIUM CHLORIDE 0.9 % (FLUSH) 0.9 %
5-40 SYRINGE (ML) INJECTION PRN
Status: CANCELLED | OUTPATIENT
Start: 2025-02-20

## 2025-02-20 RX ORDER — SODIUM CHLORIDE 0.9 % (FLUSH) 0.9 %
5-40 SYRINGE (ML) INJECTION EVERY 12 HOURS SCHEDULED
Status: CANCELLED | OUTPATIENT
Start: 2025-02-20

## 2025-03-21 PROBLEM — Z12.11 ENCOUNTER FOR SCREENING COLONOSCOPY: Status: RESOLVED | Noted: 2025-02-19 | Resolved: 2025-03-21

## 2025-03-26 PROBLEM — Z12.11 ENCOUNTER FOR SCREENING COLONOSCOPY: Status: ACTIVE | Noted: 2025-02-19

## 2025-04-25 PROBLEM — Z12.11 ENCOUNTER FOR SCREENING COLONOSCOPY: Status: RESOLVED | Noted: 2025-02-19 | Resolved: 2025-04-25

## (undated) DEVICE — PACK PROCEDURE SURG TOT KNEE

## (undated) DEVICE — Device: Brand: JELCO

## (undated) DEVICE — Device

## (undated) DEVICE — HANDPIECE SET WITH COAXIAL HIGH FLOW TIP AND SUCTION TUBE: Brand: INTERPULSE

## (undated) DEVICE — YANKAUER,BULB TIP,W/O VENT,RIGID,STERILE: Brand: MEDLINE

## (undated) DEVICE — SUTURE ETHBND EXCEL SZ 2 L30IN NONABSORBABLE GRN L40MM V-37 MX69G

## (undated) DEVICE — BIPOLAR SEALER 23-112-1 AQM 6.0: Brand: AQUAMANTYS ®

## (undated) DEVICE — SYR LR LCK 1ML GRAD NSAF 30ML --

## (undated) DEVICE — SYR 50ML LR LCK 1ML GRAD NSAF --

## (undated) DEVICE — CURETTE BNE CEM 10IN DISP --

## (undated) DEVICE — PREP SKN CHLRAPRP APL 26ML STR --

## (undated) DEVICE — Z DISCONTINUED USE 2744636  DRESSING AQUACEL 14 IN ALG W3.5XL14IN POLYUR FLM CVR W/ HYDRCOLL

## (undated) DEVICE — BUTTON SWITCH PENCIL BLADE ELECTRODE, HOLSTER: Brand: EDGE

## (undated) DEVICE — DRAPE,TOP,102X53,STERILE: Brand: MEDLINE

## (undated) DEVICE — Device: Brand: STABLECUT®

## (undated) DEVICE — SOLUTION IV 1000ML 0.9% SOD CHL

## (undated) DEVICE — T4 HOOD

## (undated) DEVICE — STERILE PRESSURE PROTECTOR PAD® FOR DE MAYO UNIVERSAL DISTRACTOR® (10/CASE): Brand: DE MAYO UNIVERSAL DISTRACTOR®

## (undated) DEVICE — SUTURE MCRYL SZ 3-0 L27IN ABSRB UD L24MM PS-1 3/8 CIR PRIM Y936H

## (undated) DEVICE — SUTURE MCRYL SZ 2-0 L27IN ABSRB UD CP-1 1 L36MM 1/2 CIR REV Y266H

## (undated) DEVICE — BANDAGE COMPR SELF ADH 5 YDX4 IN TAN STRL PREMIERPRO LF

## (undated) DEVICE — REM POLYHESIVE ADULT PATIENT RETURN ELECTRODE: Brand: VALLEYLAB